# Patient Record
Sex: FEMALE | Race: BLACK OR AFRICAN AMERICAN | NOT HISPANIC OR LATINO | Employment: FULL TIME | URBAN - METROPOLITAN AREA
[De-identification: names, ages, dates, MRNs, and addresses within clinical notes are randomized per-mention and may not be internally consistent; named-entity substitution may affect disease eponyms.]

---

## 2010-12-28 LAB
CHOLEST SERPL-MCNC: 202 MG/DL
HDLC SERPL-MCNC: 62 MG/DL
LDLC SERPL CALC-MCNC: NORMAL MG/DL
NONHDLC SERPL-MCNC: NORMAL MG/DL
TRIGL SERPL-MCNC: 132 MG/DL
TSH SERPL-ACNC: 2.52

## 2012-05-31 LAB
CHOLEST SERPL-MCNC: 162 MG/DL
HDLC SERPL-MCNC: 48 MG/DL
LDLC SERPL CALC-MCNC: 91 MG/DL
NONHDLC SERPL-MCNC: NORMAL MG/DL
TRIGL SERPL-MCNC: 113 MG/DL
TSH SERPL-ACNC: 2.02 UIU/ML

## 2015-11-25 LAB
HBA1C MFR BLD: 10.1 % (ref 0–5.7)
PAP-ABSTRACT: NORMAL
TSH SERPL-ACNC: 1.75 UIU/ML

## 2016-12-15 LAB
CHOLEST SERPL-MCNC: 185 MG/DL
CHOLEST SERPL-MCNC: 185 MG/DL (ref 100–199)
HBA1C MFR BLD: 6.5 % (ref 0–5.7)
HBA1C MFR BLD: 6.5 % (ref 4.8–5.6)
HDLC SERPL-MCNC: 47 MG/DL
HDLC SERPL-MCNC: 47 MG/DL
LDLC SERPL CALC-MCNC: 107 MG/DL
LDLC SERPL CALC-MCNC: 107 MG/DL (ref 0–99)
NONHDLC SERPL-MCNC: NORMAL MG/DL
TRIGL SERPL-MCNC: 155 MG/DL
TRIGL SERPL-MCNC: 155 MG/DL (ref 0–149)
TSH SERPL-ACNC: 1.5 UIU/ML
TSH SERPL-ACNC: 1.5 UIU/ML (ref 0.45–4.5)

## 2017-01-04 ENCOUNTER — OFFICE VISIT (OUTPATIENT)
Dept: FAMILY MEDICINE | Facility: CLINIC | Age: 52
End: 2017-01-04

## 2017-01-04 VITALS
WEIGHT: 221 LBS | OXYGEN SATURATION: 98 % | HEART RATE: 84 BPM | SYSTOLIC BLOOD PRESSURE: 102 MMHG | HEIGHT: 64 IN | DIASTOLIC BLOOD PRESSURE: 68 MMHG | BODY MASS INDEX: 37.73 KG/M2

## 2017-01-04 DIAGNOSIS — D64.9 ANEMIA, UNSPECIFIED TYPE: ICD-10-CM

## 2017-01-04 DIAGNOSIS — R73.02 IGT (IMPAIRED GLUCOSE TOLERANCE): Primary | ICD-10-CM

## 2017-01-04 DIAGNOSIS — I10 BENIGN ESSENTIAL HYPERTENSION: ICD-10-CM

## 2017-01-04 PROCEDURE — 99214 OFFICE O/P EST MOD 30 MIN: CPT | Performed by: FAMILY MEDICINE

## 2017-01-04 NOTE — PROGRESS NOTES
"Here to review labs done at her GYN.   Lipids and TSH were normal; A1c 6.5% and HGB 10.3.   She has had a history of mild anemia, for which she takes iron. She is still menstruating, but periods are not heavy. She states she has history of fibroids.   She has gained weight over the past year, her job is mostly desk work. She feels she does some stress eating. Maternal uncle with diabetes.  + GERD, no dark stools.  Patient Active Problem List   Diagnosis     Benign essential hypertension     Current Outpatient Prescriptions   Medication     omeprazole (PRILOSEC) 20 MG CR capsule     lisinopril-hydrochlorothiazide (PRINZIDE,ZESTORETIC) 10-12.5 MG per tablet     No current facility-administered medications for this visit.     OBJECTIVE: /68 mmHg  Pulse 84  Ht 1.619 m (5' 3.75\")  Wt 100.245 kg (221 lb)  BMI 38.24 kg/m2  SpO2 98%  LMP 12/20/2016 NAD intelligent good mood.   (R73.02) IGT (impaired glucose tolerance)  (primary encounter diagnosis)  Comment:   Plan: given a low refined carb diet, and advice to make small changes, but try to eliminate refined carbs, mac wheat flour and sugars. RTC 2 months for recheck. Metformin if no improvement.    (D64.9) Anemia, unspecified type  Comment: no other CBC parameters available, but likely microcytic  Plan: trial of iron with Vit C and RTC 2 months.    (I10) Benign essential hypertension  Comment: well controlled  Plan: no change      "

## 2017-01-04 NOTE — MR AVS SNAPSHOT
After Visit Summary   1/4/2017    Cheyenne Beckman    MRN: [id number]           Patient Information     Date Of Birth          1965        Visit Information        Provider Department      1/4/2017 4:30 PM Bk Brown MD [facility]        Today's Diagnoses     IGT (impaired glucose tolerance)    -  1     Anemia, unspecified type           Care Instructions    Eliminate almost all processed foods, including bakery products made from wheat, refined sugar, and high fructose corn syrup from the diet. No more than 4 oz of any fruit juice daily. Vegetable juices would be an good substitute. Limit white potatoes, corn, and rice to 1/2 cup a day total, if trying to lose weight. Eat all other fruits and vegetables wanted, and TRY to eat green vegetables (leafy greens, broccoli, celery, Brussell sprouts, etc.) every day. For grains, oatmeal, brown or wild rice are fine. Aged cheeses and yogurts are fine, but limit milk to a glass a day. Eat good fats ( olive oil, grassfed butter- find at [employer], co-ops, etc-expensive),and avoid all deep fried foods.  Eat fish, poultry, limited beef and pork, eggs(try to get eggs with high omega 3),  Small amounts of nuts and peanuts. Modest amounts of dark chocolate are healthy. Honey in moderation seems to be a healthy sweetener.    For weight loss, limit your sitting by walking, house and yard work, or more formal exercise; and keep track of your calories. Weight yourself everyday, and keep a record! Try to reduce your food portions. Goal is to lose 1-2 pounds a month(not week!).    Ferrous sulfate 325 mg twice daily, taken with Vit C 500 mg twice daily. Recheck hemoglobin in 2 months.          Follow-ups after your visit        Who to contact     If you have questions or need follow up information about today's clinic visit or your schedule please contact [facility] directly at [phone].  Normal or non-critical lab and imaging  "results will be communicated to you by MyChart, letter or phone within 4 business days after the clinic has received the results. If you do not hear from us within 7 days, please contact the clinic through Dealisedt or phone. If you have a critical or abnormal lab result, we will notify you by phone as soon as possible.  Submit refill requests through Arkimedia or call your pharmacy and they will forward the refill request to us. Please allow 3 business days for your refill to be completed.          Additional Information About Your Visit        [employer] Information     Arkimedia lets you send messages to your doctor, view your test results, renew your prescriptions, schedule appointments and more. To sign up, go to [email or web address] . Click on \"Log in\" on the left side of the screen, which will take you to the Welcome page. Then click on \"Sign up Now\" on the right side of the page.     You will be asked to enter the access code listed below, as well as some personal information. Please follow the directions to create your username and password.     Your access code is: [email or web address]  Expires: 2017  5:09 PM     Your access code will  in 90 days. If you need help or a new code, please call your [place] clinic or [phone].        Care EveryWhere ID     This is your Care EveryWhere ID. This could be used by other organizations to access your [place] medical records  [id number]        Your Vitals Were     Pulse Height BMI (Body Mass Index) Pulse Oximetry Last Period       84 1.619 m (5' 3.75\") 38.24 kg/m2 98% 2016        Blood Pressure from Last 3 Encounters:   17 102/68   16 160/85   16 138/88    Weight from Last 3 Encounters:   17 100.245 kg (221 lb)   16 90.719 kg (200 lb)   16 99.338 kg (219 lb)              Today, you had the following     No orders found for display       Primary Care Provider Office Phone # Fax #    Bk Brown [phone] " [phone]       [facility] [address]  [facility] [address]        Thank you!     Thank you for choosing [facility]  for your care. Our goal is always to provide you with excellent care. Hearing back from our patients is one way we can continue to improve our services. Please take a few minutes to complete the written survey that you may receive in the mail after your visit with us. Thank you!             Your Updated Medication List - Protect others around you: Learn how to safely use, store and throw away your medicines at www.disposemymeds.org.          This list is accurate as of: 1/4/17  5:09 PM.  Always use your most recent med list.                   Brand Name Dispense Instructions for use    lisinopril-hydrochlorothiazide 10-12.5 MG per tablet    PRINZIDE/ZESTORETIC    90 tablet    Take 1 tablet by mouth daily       omeprazole 20 MG CR capsule    priLOSEC     TAKE 1 CAPSULE ONCE A DAY BY MOUTH FOR 30 DAY(S)

## 2017-01-04 NOTE — PATIENT INSTRUCTIONS
Eliminate almost all processed foods, including bakery products made from wheat, refined sugar, and high fructose corn syrup from the diet. No more than 4 oz of any fruit juice daily. Vegetable juices would be an good substitute. Limit white potatoes, corn, and rice to 1/2 cup a day total, if trying to lose weight. Eat all other fruits and vegetables wanted, and TRY to eat green vegetables (leafy greens, broccoli, celery, Brussell sprouts, etc.) every day. For grains, oatmeal, brown or wild rice are fine. Aged cheeses and yogurts are fine, but limit milk to a glass a day. Eat good fats ( olive oil, grassfed butter- find at [employer], co-ops, etc-expensive),and avoid all deep fried foods.  Eat fish, poultry, limited beef and pork, eggs(try to get eggs with high omega 3),  Small amounts of nuts and peanuts. Modest amounts of dark chocolate are healthy. Honey in moderation seems to be a healthy sweetener.    For weight loss, limit your sitting by walking, house and yard work, or more formal exercise; and keep track of your calories. Weight yourself everyday, and keep a record! Try to reduce your food portions. Goal is to lose 1-2 pounds a month(not week!).    Ferrous sulfate 325 mg twice daily, taken with Vit C 500 mg twice daily. Recheck hemoglobin in 2 months.

## 2017-02-17 ENCOUNTER — HOSPITAL ENCOUNTER (OUTPATIENT)
Dept: MAMMOGRAPHY | Facility: CLINIC | Age: 52
Discharge: HOME | End: 2017-02-17
Attending: OBSTETRICS & GYNECOLOGY | Admitting: OBSTETRICS & GYNECOLOGY
Payer: COMMERCIAL

## 2017-02-17 DIAGNOSIS — Z12.31 VISIT FOR SCREENING MAMMOGRAM: ICD-10-CM

## 2017-02-17 PROCEDURE — G0202 SCR MAMMO BI INCL CAD: HCPCS

## 2017-02-27 ENCOUNTER — TRANSFERRED RECORDS (OUTPATIENT)
Dept: FAMILY MEDICINE | Facility: CLINIC | Age: 52
End: 2017-02-27

## 2017-03-24 ENCOUNTER — TELEPHONE (OUTPATIENT)
Dept: FAMILY MEDICINE | Facility: CLINIC | Age: 52
End: 2017-03-24

## 2017-03-24 NOTE — LETTER
[facility] [address] [place] [address]  Phone: [phone]          3/24/2017      Cheyenne Beckman      TO WHOM IT MAY CONCERN:    Cheyenne [place]ton called our clinic 3/22/17 and spoke with nurse regarding flu-like symptoms including fever, cough and body aches. Due to illness, she was unable to work 3/22-3/24.           Bk Brown MD

## 2017-08-25 DIAGNOSIS — Z76.0 ENCOUNTER FOR MEDICATION REFILL: ICD-10-CM

## 2017-08-25 RX ORDER — LISINOPRIL/HYDROCHLOROTHIAZIDE 10-12.5 MG
1 TABLET ORAL DAILY
Qty: 30 TABLET | Refills: 0 | Status: SHIPPED | OUTPATIENT
Start: 2017-08-25 | End: 2017-09-22

## 2017-09-01 ENCOUNTER — OFFICE VISIT (OUTPATIENT)
Dept: FAMILY MEDICINE | Facility: CLINIC | Age: 52
End: 2017-09-01

## 2017-09-01 VITALS
TEMPERATURE: 98.3 F | WEIGHT: 217.4 LBS | OXYGEN SATURATION: 97 % | HEIGHT: 64 IN | SYSTOLIC BLOOD PRESSURE: 112 MMHG | BODY MASS INDEX: 37.11 KG/M2 | DIASTOLIC BLOOD PRESSURE: 76 MMHG | HEART RATE: 76 BPM | RESPIRATION RATE: 20 BRPM

## 2017-09-01 DIAGNOSIS — R73.02 IGT (IMPAIRED GLUCOSE TOLERANCE): ICD-10-CM

## 2017-09-01 DIAGNOSIS — I10 BENIGN ESSENTIAL HYPERTENSION: Primary | ICD-10-CM

## 2017-09-01 DIAGNOSIS — D50.0 IRON DEFICIENCY ANEMIA DUE TO CHRONIC BLOOD LOSS: ICD-10-CM

## 2017-09-01 LAB
% GRANULOCYTES: 53.7 % (ref 42.2–75.2)
HCT VFR BLD AUTO: 35.5 % (ref 35–46)
HEMOGLOBIN: 11.4 G/DL (ref 11.8–15.5)
LYMPHOCYTES NFR BLD AUTO: 38.3 % (ref 20.5–51.1)
MCH RBC QN AUTO: 27.4 PG (ref 27–31)
MCHC RBC AUTO-ENTMCNC: 32 G/DL (ref 33–37)
MCV RBC AUTO: 85.4 FL (ref 80–100)
MONOCYTES NFR BLD AUTO: 8 % (ref 1.7–9.3)
PLATELET # BLD AUTO: 236 K/UL (ref 140–450)
RBC # BLD AUTO: 4.16 X10/CMM (ref 3.7–5.2)
WBC # BLD AUTO: 5 X10/CMM (ref 3.8–11)

## 2017-09-01 PROCEDURE — 36415 COLL VENOUS BLD VENIPUNCTURE: CPT | Performed by: FAMILY MEDICINE

## 2017-09-01 PROCEDURE — 83540 ASSAY OF IRON: CPT | Mod: 90 | Performed by: FAMILY MEDICINE

## 2017-09-01 PROCEDURE — 85025 COMPLETE CBC W/AUTO DIFF WBC: CPT | Performed by: FAMILY MEDICINE

## 2017-09-01 PROCEDURE — 83036 HEMOGLOBIN GLYCOSYLATED A1C: CPT | Mod: 90 | Performed by: FAMILY MEDICINE

## 2017-09-01 PROCEDURE — 83550 IRON BINDING TEST: CPT | Mod: 90 | Performed by: FAMILY MEDICINE

## 2017-09-01 PROCEDURE — 80048 BASIC METABOLIC PNL TOTAL CA: CPT | Mod: 90 | Performed by: FAMILY MEDICINE

## 2017-09-01 PROCEDURE — 99214 OFFICE O/P EST MOD 30 MIN: CPT | Performed by: FAMILY MEDICINE

## 2017-09-01 RX ORDER — FERROUS SULFATE 325(65) MG
325 TABLET ORAL
COMMUNITY
End: 2022-09-19

## 2017-09-01 NOTE — MR AVS SNAPSHOT
"              After Visit Summary   2017    Cheyenne Beckman    MRN: [id number]           Patient Information     Date Of Birth          1965        Visit Information        Provider Department      2017 8:15 AM Bk Brown MD [facility]        Today's Diagnoses     Benign essential hypertension    -  1    Iron deficiency anemia due to chronic blood loss        IGT (impaired glucose tolerance)           Follow-ups after your visit        Who to contact     If you have questions or need follow up information about today's clinic visit or your schedule please contact [facility] directly at [phone].  Normal or non-critical lab and imaging results will be communicated to you by Callisionhart, letter or phone within 4 business days after the clinic has received the results. If you do not hear from us within 7 days, please contact the clinic through Callisionhart or phone. If you have a critical or abnormal lab result, we will notify you by phone as soon as possible.  Submit refill requests through TVTY or call your pharmacy and they will forward the refill request to us. Please allow 3 business days for your refill to be completed.          Additional Information About Your Visit        MyChart Information     TVTY lets you send messages to your doctor, view your test results, renew your prescriptions, schedule appointments and more. To sign up, go to [email or web address] . Click on \"Log in\" on the left side of the screen, which will take you to the Welcome page. Then click on \"Sign up Now\" on the right side of the page.     You will be asked to enter the access code listed below, as well as some personal information. Please follow the directions to create your username and password.     Your access code is: [email or web address]  Expires: 2017  3:09 PM     Your access code will  in 90 days. If you need help or a new code, please call your [place] clinic or " "[phone].        Care EveryWhere ID     This is your Care EveryWhere ID. This could be used by other organizations to access your [place] medical records  [id number]        Your Vitals Were     Pulse Temperature Respirations Height Last Period Pulse Oximetry    76 98.3  F (36.8  C) (Oral) 20 1.613 m (5' 3.5\") 08/23/2017 (Exact Date) 97%    BMI (Body Mass Index)                   37.91 kg/m2            Blood Pressure from Last 3 Encounters:   09/01/17 112/76   01/04/17 102/68   04/22/16 160/85    Weight from Last 3 Encounters:   09/01/17 98.6 kg (217 lb 6.4 oz)   01/04/17 100.2 kg (221 lb)   04/22/16 90.7 kg (200 lb)              We Performed the Following     Basic Metabolic Panel (8) ([facility])     CBC with Diff/Plt ([facility])     Hemoglobin A1C ([facility])        Primary Care Provider Office Phone # Fax #    Bk Brown [phone] [address] [facility] [address]        Equal Access to Services     [facility] AH: Hadii aad ku hadasho Soomaali, waaxda luqadaha, qaybta kaalmada adeegyada, yuridia daugherty hayhiram rapp . So [facility] [phone].    ATENCIÓN: Si habla español, tiene a woodruff disposición servicios gratuitos de asistencia lingüística. Llame al [phone].    We comply with applicable federal civil rights laws and [place] laws. We do not discriminate on the basis of race, color, national origin, age, disability sex, sexual orientation or gender identity.            Thank you!     Thank you for choosing [facility]  for your care. Our goal is always to provide you with excellent care. Hearing back from our patients is one way we can continue to improve our services. Please take a few minutes to complete the written survey that you may receive in the mail after your visit with us. Thank you!             Your Updated Medication List - Protect others around you: Learn how to safely use, store and throw away your medicines at www.disposemymeds.org.       "    This list is accurate as of: 9/1/17  3:09 PM.  Always use your most recent med list.                   Brand Name Dispense Instructions for use Diagnosis    ferrous sulfate 325 (65 FE) MG tablet    IRON     Take 325 mg by mouth daily (with breakfast) Recommend take with vitamin c        lisinopril-hydrochlorothiazide 10-12.5 MG per tablet    PRINZIDE/ZESTORETIC    30 tablet    Take 1 tablet by mouth daily    Encounter for medication refill       omeprazole 20 MG CR capsule    priLOSEC     as needed

## 2017-09-01 NOTE — PROGRESS NOTES
"Recheck elevated A1c, she has lost about 4# form this spring, when her A1c was 6.5%.   Anemia noted at GYN's. History of heavy periods.   Also c/o intermittent epigastric pain, without change in stools, but occasionally nausea.. She has been on omeprazole off and on, and it helps. She had been taking iron supplements, and also PPI, but stopped these as she was not seeing much change. Colonoscopy normal this year.  OBJECTIVE: /76  Pulse 76  Temp 98.3  F (36.8  C) (Oral)  Resp 20  Ht 1.613 m (5' 3.5\")  Wt 98.6 kg (217 lb 6.4 oz)  LMP 08/23/2017 (Exact Date)  SpO2 97%  BMI 37.91 kg/m2 NAD RRR. Lungs are clear. Abdomen has slight epigastric and LLQ tenderness. Obese.  Lab Results   Component Value Date    WBC 5.0 09/01/2017     Lab Results   Component Value Date    RBC 4.16 09/01/2017     Lab Results   Component Value Date    HGB 11.4 09/01/2017     Lab Results   Component Value Date    HCT 35.5 09/01/2017     No components found for: MCT  Lab Results   Component Value Date    MCV 85.4 09/01/2017     Lab Results   Component Value Date    MCH 27.4 09/01/2017     Lab Results   Component Value Date    MCHC 32.0 09/01/2017     No results found for: RDW  Lab Results   Component Value Date     09/01/2017       (I10) Benign essential hypertension  (primary encounter diagnosis)  Comment:   Plan: Basic Metabolic Panel (8) ([facility])            (D50.0) Iron deficiency anemia due to chronic blood loss  Comment: GI or GYN  Plan: CBC with Diff/Plt ([facility])        Add FE, TIBC  IBC. prob needs EGD    (R73.02) IGT (impaired glucose tolerance)  Comment:   Plan: Hemoglobin A1C ([facility])                "

## 2017-09-02 LAB
BUN SERPL-MCNC: 11 MG/DL (ref 6–24)
BUN/CREATININE RATIO: 13 (ref 9–23)
CALCIUM SERPL-MCNC: 9.1 MG/DL (ref 8.7–10.2)
CHLORIDE SERPLBLD-SCNC: 101 MMOL/L (ref 96–106)
CREAT SERPL-MCNC: 0.88 MG/DL (ref 0.57–1)
EGFR IF AFRICN AM: 89 ML/MIN/1.73
EGFR IF NONAFRICN AM: 77 ML/MIN/1.73
GLUCOSE SERPL-MCNC: 112 MG/DL (ref 65–99)
HBA1C MFR BLD: 6.4 % (ref 4.8–5.6)
POTASSIUM SERPL-SCNC: 4.3 MMOL/L (ref 3.5–5.2)
SODIUM SERPL-SCNC: 140 MMOL/L (ref 134–144)
TOTAL CO2: 25 MMOL/L (ref 18–28)

## 2017-09-07 LAB
IRON BINDING CAP: 323 UG/DL (ref 250–450)
IRON SATURATION: 18 % (ref 15–55)
IRON: 58 UG/DL (ref 27–159)
UIBC: 265 UG/DL (ref 131–425)

## 2017-09-08 ENCOUNTER — TRANSFERRED RECORDS (OUTPATIENT)
Dept: FAMILY MEDICINE | Facility: CLINIC | Age: 52
End: 2017-09-08

## 2017-09-22 DIAGNOSIS — Z76.0 ENCOUNTER FOR MEDICATION REFILL: ICD-10-CM

## 2017-09-22 RX ORDER — LISINOPRIL/HYDROCHLOROTHIAZIDE 10-12.5 MG
1 TABLET ORAL DAILY
Qty: 90 TABLET | Refills: 3 | Status: SHIPPED | OUTPATIENT
Start: 2017-09-22 | End: 2018-09-11

## 2017-09-22 NOTE — TELEPHONE ENCOUNTER
BP Medication refill      BP Readings from Last 3 Encounters:   09/01/17 112/76   01/04/17 102/68   04/22/16 160/85         BMP within 6 months? yes  Last Basic Metabolic Panel:  Lab Results   Component Value Date     09/01/2017      Lab Results   Component Value Date    POTASSIUM 4.3 09/01/2017     Lab Results   Component Value Date    CHLORIDE 101 09/01/2017     Lab Results   Component Value Date    SUZANNE 9.1 09/01/2017     No results found for: CO2  Lab Results   Component Value Date    BUN 11 09/01/2017    BUN 13 09/01/2017     Lab Results   Component Value Date    CR 0.88 09/01/2017     Lab Results   Component Value Date     09/01/2017

## 2017-10-18 ENCOUNTER — TELEPHONE (OUTPATIENT)
Dept: FAMILY MEDICINE | Facility: CLINIC | Age: 52
End: 2017-10-18

## 2017-10-19 NOTE — TELEPHONE ENCOUNTER
10/18/17 patient calls with questions regarding September lab results.   1. Anemia/low iron- patient is not taking iron supplements - hadn't received result note to start these. Discussed Dr. Brown recommends repeat CBC to see where hgb is now since 6 weeks have passed since last check. Patient agrees and relates still having intermittent epigastric discomfort, thinks possibly worse with dairy products. Saw ENT and states he thought possibly GERD. Patient would like to see Dr. Brown again to check CBC and discuss and get plan. Appt scheduled for 10/27/17. She will keep a log regarding food and epigastric discomfort. Ok to try antacids or PPI if not on.    2. Hgba1c of 6.4% 9/2017 and 6.5% 12/2016 at gyne. Plan: per Dr. Brown - recommend diet/exercise/weight loss and repeat in 3/2018. Discussed and patient agrees. Mailed low carb diet info.   Frances Hopkins RN

## 2017-11-17 ENCOUNTER — OFFICE VISIT (OUTPATIENT)
Dept: FAMILY MEDICINE | Facility: CLINIC | Age: 52
End: 2017-11-17

## 2017-11-17 DIAGNOSIS — Z53.9 NO SHOW: Primary | ICD-10-CM

## 2017-11-17 PROCEDURE — 99207 ZZC NO SHOW FOR SCHEDULED APPT: CPT | Performed by: FAMILY MEDICINE

## 2017-11-17 NOTE — MR AVS SNAPSHOT
"              After Visit Summary   2017    Cheyenne Beckman    MRN: [id number]           Patient Information     Date Of Birth          1965        Visit Information        Provider Department      2017 12:45 PM Bk Brown MD [facility]        Today's Diagnoses     NO SHOW    -  1       Follow-ups after your visit        Who to contact     If you have questions or need follow up information about today's clinic visit or your schedule please contact [facility] directly at [phone].  Normal or non-critical lab and imaging results will be communicated to you by MyChart, letter or phone within 4 business days after the clinic has received the results. If you do not hear from us within 7 days, please contact the clinic through CallGraderhart or phone. If you have a critical or abnormal lab result, we will notify you by phone as soon as possible.  Submit refill requests through TableNOW or call your pharmacy and they will forward the refill request to us. Please allow 3 business days for your refill to be completed.          Additional Information About Your Visit        MyChart Information     TableNOW lets you send messages to your doctor, view your test results, renew your prescriptions, schedule appointments and more. To sign up, go to [email or web address] . Click on \"Log in\" on the left side of the screen, which will take you to the Welcome page. Then click on \"Sign up Now\" on the right side of the page.     You will be asked to enter the access code listed below, as well as some personal information. Please follow the directions to create your username and password.     Your access code is: [email or web address]  Expires: 2018  9:56 AM     Your access code will  in 90 days. If you need help or a new code, please call your [place] clinic or [phone].        Care EveryWhere ID     This is your Care EveryWhere ID. This could be used by other organizations to " access your [place] medical records  [id number]        Your Vitals Were     Last Period                   11/06/2017 (Approximate)            Blood Pressure from Last 3 Encounters:   11/29/17 102/66   09/01/17 112/76   01/04/17 102/68    Weight from Last 3 Encounters:   11/29/17 101.6 kg (224 lb)   09/01/17 98.6 kg (217 lb 6.4 oz)   01/04/17 100.2 kg (221 lb)              Today, you had the following     No orders found for display       Primary Care Provider Office Phone # Fax #    Bk Brown [phone] [address] [facility] [address]        Equal Access to Services     [employer] : Hadii courtney hough hadasho Soabdelrahmanali, waaxda luqadaha, qaybta kaalmada adeegyada, yuridia rapp . So [facility] [phone].    ATENCIÓN: Si habla español, tiene a woodruff disposición servicios gratuitos de asistencia lingüística. Llame al [phone].    We comply with applicable federal civil rights laws and [place] laws. We do not discriminate on the basis of race, color, national origin, age, disability, sex, sexual orientation, or gender identity.            Thank you!     Thank you for choosing [facility]  for your care. Our goal is always to provide you with excellent care. Hearing back from our patients is one way we can continue to improve our services. Please take a few minutes to complete the written survey that you may receive in the mail after your visit with us. Thank you!             Your Updated Medication List - Protect others around you: Learn how to safely use, store and throw away your medicines at www.disposemymeds.org.          This list is accurate as of 11/17/17 11:59 PM.  Always use your most recent med list.                   Brand Name Dispense Instructions for use Diagnosis    ferrous sulfate 325 (65 Fe) MG tablet    IRON     Take 325 mg by mouth daily (with breakfast) Recommend take with vitamin c        lisinopril-hydrochlorothiazide  10-12.5 MG per tablet    PRINZIDE/ZESTORETIC    90 tablet    Take 1 tablet by mouth daily    Encounter for medication refill

## 2017-11-29 ENCOUNTER — OFFICE VISIT (OUTPATIENT)
Dept: FAMILY MEDICINE | Facility: CLINIC | Age: 52
End: 2017-11-29

## 2017-11-29 VITALS
SYSTOLIC BLOOD PRESSURE: 102 MMHG | DIASTOLIC BLOOD PRESSURE: 66 MMHG | BODY MASS INDEX: 39.06 KG/M2 | HEART RATE: 64 BPM | RESPIRATION RATE: 20 BRPM | WEIGHT: 224 LBS

## 2017-11-29 DIAGNOSIS — R10.13 ABDOMINAL PAIN, EPIGASTRIC: ICD-10-CM

## 2017-11-29 DIAGNOSIS — D50.0 IRON DEFICIENCY ANEMIA DUE TO CHRONIC BLOOD LOSS: Primary | ICD-10-CM

## 2017-11-29 LAB
% GRANULOCYTES: 50.2 % (ref 42.2–75.2)
HCT VFR BLD AUTO: 33.9 % (ref 35–46)
HEMOGLOBIN: 10.9 G/DL (ref 11.8–15.5)
LYMPHOCYTES NFR BLD AUTO: 43.3 % (ref 20.5–51.1)
MCH RBC QN AUTO: 27.8 PG (ref 27–31)
MCHC RBC AUTO-ENTMCNC: 32.2 G/DL (ref 33–37)
MCV RBC AUTO: 86.3 FL (ref 80–100)
MONOCYTES NFR BLD AUTO: 6.5 % (ref 1.7–9.3)
PLATELET # BLD AUTO: 232 K/UL (ref 140–450)
RBC # BLD AUTO: 3.93 X10/CMM (ref 3.7–5.2)
WBC # BLD AUTO: 6.3 X10/CMM (ref 3.8–11)

## 2017-11-29 PROCEDURE — 99214 OFFICE O/P EST MOD 30 MIN: CPT | Performed by: FAMILY MEDICINE

## 2017-11-29 PROCEDURE — 36415 COLL VENOUS BLD VENIPUNCTURE: CPT | Performed by: FAMILY MEDICINE

## 2017-11-29 PROCEDURE — 85025 COMPLETE CBC W/AUTO DIFF WBC: CPT | Performed by: FAMILY MEDICINE

## 2017-11-29 RX ORDER — OMEPRAZOLE 40 MG/1
40 CAPSULE, DELAYED RELEASE ORAL DAILY
Qty: 30 CAPSULE | Refills: 1 | Status: SHIPPED | OUTPATIENT
Start: 2017-11-29 | End: 2018-02-02

## 2017-11-29 NOTE — PROGRESS NOTES
Abdominal pain for several years. Epigastric, feels tight. Usually after she lies down. It may wake her. It lasts for about 30 minutes, and can cause nausea. She has vomited once. BM's are OK. She had been on PPI, but stopped, as it did not seem to be helpful.   PMH: HTN  ROS: periods heavy first 2 days. Stools normal color.  Lab Results   Component Value Date    WBC 6.3 11/29/2017     Lab Results   Component Value Date    RBC 3.93 11/29/2017     Lab Results   Component Value Date    HGB 10.9 11/29/2017     Lab Results   Component Value Date    HCT 33.9 11/29/2017     No components found for: MCT  Lab Results   Component Value Date    MCV 86.3 11/29/2017     Lab Results   Component Value Date    MCH 27.8 11/29/2017     Lab Results   Component Value Date    MCHC 32.2 11/29/2017     No results found for: RDW  Lab Results   Component Value Date     11/29/2017     /66  Pulse 64  Resp 20  Wt 101.6 kg (224 lb)  LMP 11/06/2017 (Approximate)  BMI 39.06 kg/m2   NAD alert and oriented. Abdomen is not tender    (D50.0) Iron deficiency anemia due to chronic blood loss  (primary encounter diagnosis)    (R10.13) Abdominal pain, epigastric  Comment:   Plan: EGD,         omeprazole (PRILOSEC) 40 MG capsule daily

## 2017-11-29 NOTE — MR AVS SNAPSHOT
After Visit Summary   11/29/2017    Cheyenne Beckman    MRN: [id number]           Patient Information     Date Of Birth          1965        Visit Information        Provider Department      11/29/2017 4:30 PM Bk Brown MD [facility]        Today's Diagnoses     Iron deficiency anemia due to chronic blood loss    -  1    Abdominal pain, epigastric           Follow-ups after your visit        Additional Services     GASTROENTEROLOGY ADULT REF PROCEDURE ONLY       Last Lab Result: Creatinine (mg/dL)       Date                     Value                 09/01/2017               0.88             ----------  Body mass index is 39.06 kg/(m^2).     Needed:  No  Language:  English    Patient will be contacted to schedule procedure.     Please be aware that coverage of these services is subject to the terms and limitations of your health insurance plan.  Call member services at your health plan with any benefit or coverage questions.  Any procedures must be performed at a [place] facility OR coordinated by your clinic's referral office.    Please bring the following with you to your appointment:    (1) Any X-Rays, CTs or MRIs which have been performed.  Contact the facility where they were done to arrange for  prior to your scheduled appointment.    (2) List of current medications   (3) This referral request   (4) Any documents/labs given to you for this referral                  Who to contact     If you have questions or need follow up information about today's clinic visit or your schedule please contact [facility] directly at [phone].  Normal or non-critical lab and imaging results will be communicated to you by MyChart, letter or phone within 4 business days after the clinic has received the results. If you do not hear from us within 7 days, please contact the clinic through MyChart or phone. If you have a critical or abnormal lab result, we  "will notify you by phone as soon as possible.  Submit refill requests through [employer] or call your pharmacy and they will forward the refill request to us. Please allow 3 business days for your refill to be completed.          Additional Information About Your Visit        WinningAdvantagehart Information     [employer] lets you send messages to your doctor, view your test results, renew your prescriptions, schedule appointments and more. To sign up, go to [email or web address] . Click on \"Log in\" on the left side of the screen, which will take you to the Welcome page. Then click on \"Sign up Now\" on the right side of the page.     You will be asked to enter the access code listed below, as well as some personal information. Please follow the directions to create your username and password.     Your access code is: [email or web address]  Expires: 2017  2:09 PM     Your access code will  in 90 days. If you need help or a new code, please call your [place] clinic or [phone].        Care EveryWhere ID     This is your Care EveryWhere ID. This could be used by other organizations to access your [place] medical records  [id number]        Your Vitals Were     Pulse Respirations Last Period BMI (Body Mass Index)          64 20 2017 (Approximate) 39.06 kg/m2         Blood Pressure from Last 3 Encounters:   17 102/66   17 112/76   17 102/68    Weight from Last 3 Encounters:   17 101.6 kg (224 lb)   17 98.6 kg (217 lb 6.4 oz)   17 100.2 kg (221 lb)              We Performed the Following     CBC with Diff/Plt ([facility])     GASTROENTEROLOGY ADULT REF PROCEDURE ONLY        Primary Care Provider Office Phone # Fax #    Bk Brown [phone] [address]  [facility] [address]        Equal Access to Services     HELEN WEAVER : Hadii aad ku hadasho Soomaali, waaxda luqadaha, qaybta kaalmada adeegyanilay, yuridia rapp . So [facility] " [phone].    ATENCIÓN: Si rachel delarosa, tiene a woodruff disposición servicios gratuitos de asistencia lingüística. Dominic tanner [phone].    We comply with applicable federal civil rights laws and [place] laws. We do not discriminate on the basis of race, color, national origin, age, disability, sex, sexual orientation, or gender identity.            Thank you!     Thank you for choosing [facility]  for your care. Our goal is always to provide you with excellent care. Hearing back from our patients is one way we can continue to improve our services. Please take a few minutes to complete the written survey that you may receive in the mail after your visit with us. Thank you!             Your Updated Medication List - Protect others around you: Learn how to safely use, store and throw away your medicines at www.disposemymeds.org.          This list is accurate as of: 11/29/17  5:17 PM.  Always use your most recent med list.                   Brand Name Dispense Instructions for use Diagnosis    ferrous sulfate 325 (65 FE) MG tablet    IRON     Take 325 mg by mouth daily (with breakfast) Recommend take with vitamin c        lisinopril-hydrochlorothiazide 10-12.5 MG per tablet    PRINZIDE/ZESTORETIC    90 tablet    Take 1 tablet by mouth daily    Encounter for medication refill       omeprazole 20 MG CR capsule    priLOSEC     as needed

## 2017-12-27 ENCOUNTER — TRANSFERRED RECORDS (OUTPATIENT)
Dept: FAMILY MEDICINE | Facility: CLINIC | Age: 52
End: 2017-12-27

## 2018-01-05 ENCOUNTER — TRANSFERRED RECORDS (OUTPATIENT)
Dept: FAMILY MEDICINE | Facility: CLINIC | Age: 53
End: 2018-01-05

## 2018-02-02 DIAGNOSIS — D50.0 IRON DEFICIENCY ANEMIA DUE TO CHRONIC BLOOD LOSS: ICD-10-CM

## 2018-02-02 DIAGNOSIS — R10.13 ABDOMINAL PAIN, EPIGASTRIC: ICD-10-CM

## 2018-02-02 DIAGNOSIS — Z79.899 ENCOUNTER FOR LONG-TERM (CURRENT) USE OF MEDICATIONS: Primary | ICD-10-CM

## 2018-02-02 RX ORDER — OMEPRAZOLE 40 MG/1
CAPSULE, DELAYED RELEASE ORAL
Qty: 30 CAPSULE | Refills: 9 | Status: SHIPPED | OUTPATIENT
Start: 2018-02-02 | End: 2018-03-29

## 2018-02-02 NOTE — TELEPHONE ENCOUNTER
omeprazole (PRILOSEC) 40 MG capsule   LAST  Med check 11/29/17   last labs(for RX) 11/29/17  Next  appt scheduled =  none  Jessie Varner MA

## 2018-03-29 DIAGNOSIS — D50.0 IRON DEFICIENCY ANEMIA DUE TO CHRONIC BLOOD LOSS: ICD-10-CM

## 2018-03-29 DIAGNOSIS — Z79.899 ENCOUNTER FOR LONG-TERM (CURRENT) USE OF MEDICATIONS: ICD-10-CM

## 2018-03-29 DIAGNOSIS — R10.13 ABDOMINAL PAIN, EPIGASTRIC: ICD-10-CM

## 2018-03-30 DIAGNOSIS — R10.13 ABDOMINAL PAIN, EPIGASTRIC: ICD-10-CM

## 2018-03-30 DIAGNOSIS — D50.0 IRON DEFICIENCY ANEMIA DUE TO CHRONIC BLOOD LOSS: ICD-10-CM

## 2018-03-30 DIAGNOSIS — Z79.899 ENCOUNTER FOR LONG-TERM (CURRENT) USE OF MEDICATIONS: ICD-10-CM

## 2018-03-30 RX ORDER — OMEPRAZOLE 40 MG/1
CAPSULE, DELAYED RELEASE ORAL
Qty: 90 CAPSULE | Refills: 2 | Status: SHIPPED | OUTPATIENT
Start: 2018-03-30 | End: 2020-04-03

## 2018-03-30 RX ORDER — OMEPRAZOLE 40 MG/1
CAPSULE, DELAYED RELEASE ORAL
Qty: 90 CAPSULE | Refills: 2 | Status: SHIPPED | OUTPATIENT
Start: 2018-03-30 | End: 2018-03-30

## 2018-04-02 NOTE — TELEPHONE ENCOUNTER
Per note in EPIC, there was a transmission failure.  I called and left the information on the voice mail at the pharmacy.  After the call, I then realized this is a duplicate request and should not have been entered.  It was just approved on 3/30/19- duplicate request.      Joel Maynard,   [facility]  [phone]

## 2018-05-15 ENCOUNTER — TELEPHONE (OUTPATIENT)
Dept: FAMILY MEDICINE | Facility: CLINIC | Age: 53
End: 2018-05-15

## 2018-05-17 NOTE — TELEPHONE ENCOUNTER
Called patient with pharmacist's reply. Patient will monitor BP while taking the supplement and call clinic with any questions.  Frances Hopkins RN

## 2018-05-17 NOTE — TELEPHONE ENCOUNTER
----- Message from Amber Williamson RPH sent at 5/17/2018  3:12 PM CDT -----  Regarding: RE: BIOX4  Nucific   Yes, sorry for the delay. I think it should be okay to try, but to monitor for any increasing blood pressure as it does contain green tea, which has caffeine as a stimulant. It didn't appear to have issues with the other ingredients from what little data we do have.    Amber Williamson, Pharm.D, [facility]  Medication Therapy Management Pharmacist  [phone]    ----- Message -----     From: Frances Hopkins RN     Sent: 5/16/2018  12:03 PM       To: Amber Williamson RPH  Subject: BIOX4  Nucific                                   BIOX4 Nucific -- patient calls asking if ok to take this with her lisinopril/hctz?  What do you think? Ok to try?  Frances

## 2018-09-28 ENCOUNTER — OFFICE VISIT (OUTPATIENT)
Dept: FAMILY MEDICINE | Facility: CLINIC | Age: 53
End: 2018-09-28

## 2018-09-28 VITALS
RESPIRATION RATE: 12 BRPM | WEIGHT: 227.2 LBS | BODY MASS INDEX: 38.79 KG/M2 | HEIGHT: 64 IN | HEART RATE: 76 BPM | SYSTOLIC BLOOD PRESSURE: 118 MMHG | DIASTOLIC BLOOD PRESSURE: 64 MMHG

## 2018-09-28 DIAGNOSIS — R73.02 IGT (IMPAIRED GLUCOSE TOLERANCE): ICD-10-CM

## 2018-09-28 DIAGNOSIS — Z13.6 ENCOUNTER FOR SCREENING FOR CARDIOVASCULAR DISORDERS: ICD-10-CM

## 2018-09-28 DIAGNOSIS — D64.9 ANEMIA, UNSPECIFIED TYPE: ICD-10-CM

## 2018-09-28 DIAGNOSIS — Z76.0 ENCOUNTER FOR MEDICATION REFILL: ICD-10-CM

## 2018-09-28 DIAGNOSIS — I10 BENIGN ESSENTIAL HYPERTENSION: Primary | ICD-10-CM

## 2018-09-28 LAB
% GRANULOCYTES: 52.7 % (ref 42.2–75.2)
HCT VFR BLD AUTO: 34.5 % (ref 35–46)
HEMOGLOBIN: 11.3 G/DL (ref 11.8–15.5)
LYMPHOCYTES NFR BLD AUTO: 39.8 % (ref 20.5–51.1)
MCH RBC QN AUTO: 27.5 PG (ref 27–31)
MCHC RBC AUTO-ENTMCNC: 32.7 G/DL (ref 33–37)
MCV RBC AUTO: 84.1 FL (ref 80–100)
MONOCYTES NFR BLD AUTO: 7.5 % (ref 1.7–9.3)
PLATELET # BLD AUTO: 263 K/UL (ref 140–450)
RBC # BLD AUTO: 4.1 X10/CMM (ref 3.7–5.2)
WBC # BLD AUTO: 6.4 X10/CMM (ref 3.8–11)

## 2018-09-28 PROCEDURE — 80053 COMPREHEN METABOLIC PANEL: CPT | Mod: 90 | Performed by: NURSE PRACTITIONER

## 2018-09-28 PROCEDURE — 80061 LIPID PANEL: CPT | Mod: 90 | Performed by: NURSE PRACTITIONER

## 2018-09-28 PROCEDURE — 36415 COLL VENOUS BLD VENIPUNCTURE: CPT | Performed by: NURSE PRACTITIONER

## 2018-09-28 PROCEDURE — 99213 OFFICE O/P EST LOW 20 MIN: CPT | Performed by: NURSE PRACTITIONER

## 2018-09-28 PROCEDURE — 83036 HEMOGLOBIN GLYCOSYLATED A1C: CPT | Mod: 90 | Performed by: NURSE PRACTITIONER

## 2018-09-28 PROCEDURE — 85025 COMPLETE CBC W/AUTO DIFF WBC: CPT | Performed by: NURSE PRACTITIONER

## 2018-09-28 RX ORDER — LISINOPRIL/HYDROCHLOROTHIAZIDE 10-12.5 MG
1 TABLET ORAL DAILY
Qty: 90 TABLET | Refills: 3 | Status: SHIPPED | OUTPATIENT
Start: 2018-09-28 | End: 2019-11-12

## 2018-09-28 NOTE — MR AVS SNAPSHOT
"              After Visit Summary   9/28/2018    Cheyenne Beckman    MRN: [id number]           Patient Information     Date Of Birth          1965        Visit Information        Provider Department      9/28/2018 8:00 AM Sabrina Oscar APRN CNP [facility]        Today's Diagnoses     Benign essential hypertension    -  1    Anemia, unspecified type        IGT (impaired glucose tolerance)        Encounter for screening for cardiovascular disorders        Encounter for medication refill           Follow-ups after your visit        Follow-up notes from your care team     Return if symptoms worsen or fail to improve.      Who to contact     If you have questions or need follow up information about today's clinic visit or your schedule please contact [facility] directly at [phone].  Normal or non-critical lab and imaging results will be communicated to you by [employer], letter or phone within 4 business days after the clinic has received the results. If you do not hear from us within 7 days, please contact the clinic through [employer] or phone. If you have a critical or abnormal lab result, we will notify you by phone as soon as possible.  Submit refill requests through [employer] or call your pharmacy and they will forward the refill request to us. Please allow 3 business days for your refill to be completed.          Additional Information About Your Visit        MyChart Information     [employer] lets you send messages to your doctor, view your test results, renew your prescriptions, schedule appointments and more. To sign up, go to [email or web address] . Click on \"Log in\" on the left side of the screen, which will take you to the Welcome page. Then click on \"Sign up Now\" on the right side of the page.     You will be asked to enter the access code listed below, as well as some personal information. Please follow the directions to create your username and password.     Your access " "code is: [email or web address]  Expires: 2018  8:58 AM     Your access code will  in 90 days. If you need help or a new code, please call your [place] clinic or [phone].        Care EveryWhere ID     This is your Care EveryWhere ID. This could be used by other organizations to access your [place] medical records  [id number]        Your Vitals Were     Pulse Respirations Height Last Period BMI (Body Mass Index)       76 12 1.626 m (5' 4\") 2018 (Approximate) 39 kg/m2        Blood Pressure from Last 3 Encounters:   18 118/64   17 102/66   17 112/76    Weight from Last 3 Encounters:   18 103.1 kg (227 lb 3.2 oz)   17 101.6 kg (224 lb)   17 98.6 kg (217 lb 6.4 oz)              We Performed the Following     CBC with Diff/Plt ([facility])     Comp. Metabolic Panel (14) ([facility])     Hemoglobin A1C ([facility])     Lipid Panel ([facility])          Where to get your medicines      These medications were sent to [facility]/pharmacy #[id number] [place], [place] - [address] [facility]  [address] [place] [address]     Phone:  [phone]     lisinopril-hydrochlorothiazide 10-12.5 MG per tablet          Primary Care Provider Office Phone # Fax #    Hayde Jackie Sidhu [phone] [address] [place] [address]        Equal Access to Services     [facility]: Hadii courtney hough hadasho Soari, waaxda luqadaha, qaybta kaalmayuridia maldonado . So [facility] [phone].    ATENCIÓN: Si habla español, tiene a woodruff disposición servicios gratuitos de asistencia lingüística. Llame al [phone].    We comply with applicable federal civil rights laws and [place] laws. We do not discriminate on the basis of race, color, national origin, age, disability, sex, sexual orientation, or gender identity.            Thank you!     Thank you for choosing [facility]  for your care. Our goal is always to provide " you with excellent care. Hearing back from our patients is one way we can continue to improve our services. Please take a few minutes to complete the written survey that you may receive in the mail after your visit with us. Thank you!             Your Updated Medication List - Protect others around you: Learn how to safely use, store and throw away your medicines at www.disposemymeds.org.          This list is accurate as of 9/28/18  8:58 AM.  Always use your most recent med list.                   Brand Name Dispense Instructions for use Diagnosis    ferrous sulfate 325 (65 Fe) MG tablet    IRON     Take 325 mg by mouth daily (with breakfast) Recommend take with vitamin c        lisinopril-hydrochlorothiazide 10-12.5 MG per tablet    PRINZIDE/ZESTORETIC    90 tablet    Take 1 tablet by mouth daily    Encounter for medication refill       omeprazole 40 MG capsule    priLOSEC    90 capsule    TAKE 1 CAPSULE (40 MG) BY MOUTH DAILY    Iron deficiency anemia due to chronic blood loss, Abdominal pain, epigastric, Encounter for long-term (current) use of medications

## 2018-09-28 NOTE — LETTER
October 3, 2018      Cheyenne CALLI Beckman  [address]   [facility] [address]        Dear ,    We are writing to inform you of your test results.HGB slightly low, can continue taking iron daily.  Cholesterol good. CMP good. A1C elevated, but stable, at 6.4.  If you have been good at your diet and that is not working, may consider adding metformin 500 mg twice daily to help bring it down.  Otherwise keep trying to cut out carbs and sugars and increase exercise just a little bit each day, even a 15 min walk.  If you wish to do this I can call in to pharmacy. If you have questions please let me know.    Resulted Orders   Lipid Panel ([facility])   Result Value Ref Range    Cholesterol 166 100 - 199 mg/dL    Triglycerides 97 0 - 149 mg/dL    HDL Cholesterol 49 >39 mg/dL    VLDL Cholesterol Favian 19 5 - 40 mg/dL    LDL Cholesterol Calculated 98 0 - 99 mg/dL    LDL/HDL Ratio 2.0 0.0 - 3.2 ratio      Comment:                                          LDL/HDL Ratio                                              Men  Women                                1/2 Avg.Risk  1.0    1.5                                    Avg.Risk  3.6    3.2                                 2X Avg.Risk  6.2    5.0                                 3X Avg.Risk  8.0    6.1      Narrative    Performed at:  01 - [facility]  [address] [place] [address]  [id number]  : Catracho Reynoso MD, Phone:  [phone]   CBC with Diff/Plt ([facility])   Result Value Ref Range    WBC x10/cmm 6.4 3.8 - 11.0 x10/cmm    % Lymphocytes 39.8 20.5 - 51.1 %    % Monocytes 7.5 1.7 - 9.3 %    % Granulocytes 52.7 42.2 - 75.2 %    RBC x10/cmm 4.10 3.7 - 5.2 x10/cmm    Hemoglobin 11.3 (A) 11.8 - 15.5 g/dl    Hematocrit 34.5 (A) 35 - 46 %    MCV 84.1 80 - 100 fL    MCH 27.5 27.0 - 31.0 pg    MCHC 32.7 (A) 33.0 - 37.0 g/dL    Platelet Count 263 140 - 450 K/uL   Comp. Metabolic Panel (14) ([facility])   Result Value Ref Range    Glucose 113 (H) 65 - 99 mg/dL     Urea Nitrogen 12 6 - 24 mg/dL    Creatinine 0.84 0.57 - 1.00 mg/dL    eGFR If NonAfricn Am 81 >59 mL/min/1.73    eGFR If Africn Am 93 >59 mL/min/1.73    BUN/Creatinine Ratio 14 9 - 23    Sodium 136 134 - 144 mmol/L    Potassium 4.1 3.5 - 5.2 mmol/L    Chloride 103 96 - 106 mmol/L    Total CO2 27 20 - 29 mmol/L    Calcium 9.4 8.7 - 10.2 mg/dL    Protein Total 7.7 6.0 - 8.5 g/dL    Albumin 4.2 3.5 - 5.5 g/dL    Globulin, Total 3.5 1.5 - 4.5 g/dL    A/G Ratio 1.2 1.2 - 2.2    Bilirubin Total 0.3 0.0 - 1.2 mg/dL    Alkaline Phosphatase 105 39 - 117 IU/L    AST 17 0 - 40 IU/L    ALT 18 0 - 32 IU/L    Narrative    Performed at:  01 - [facility] [address], [place], [place]  [id number]  : Catracho Reynoso MD, Phone:  [phone]   Hemoglobin A1C ([facility])   Result Value Ref Range    Hemoglobin A1C 6.4 (H) 4.8 - 5.6 %      Comment:               Prediabetes: 5.7 - 6.4           Diabetes: >6.4           Glycemic control for adults with diabetes: <7.0      Narrative    Performed at:  01 - [facility] [address], [place], [place]  [id number]  : Catracho Reynoso MD, Phone:  [phone]       If you have any questions or concerns, please call the clinic at the number listed above.       Sincerely,        GYPSY Loaiza CNP

## 2018-09-28 NOTE — PROGRESS NOTES
Problem(s) Oriented visit        SUBJECTIVE:                                                    Cheyenne Beckman is a 53 year old female who presents to clinic today for the following health issues : Here for labs and HTN med refills. Also wants A1C checked, has been working on sweets, when eats she eats too much. Has not been taking iron, wants levels checked. Has fibroids, occasional heavy periods, especially first 2 days, periods last 5 days.Occasional swelling in feet after working all day. Has not been needing omeprazole anymore unless eats spicy foods.     Hypertension Follow-up      Outpatient blood pressures at [facility] occasionally. Have been normal.    Low Salt Diet: not monitoring salt      Amount of exercise or physical activity: 2-3 days/week for an average of 30-45 minutes    Problems taking medications regularly: No    Medication side effects: none    Diet: tires to eat extra iron, variety of foods, tries to avoids sweets          Problem list, Medication list, Allergies, and Medical/Social/Surgical histories reviewed in EPIC and updated as appropriate.   Additional history: as documented    ROS:   10 point ROS completed and negative except noted above, including Gen, HEENT, CV, Resp, GI, , MS, Neurologic, Psych    Histories:   Patient Active Problem List   Diagnosis     Benign essential hypertension     Iron deficiency anemia due to chronic blood loss     Past Surgical History:   Procedure Laterality Date     TUBAL LIGATION         Social History   Substance Use Topics     Smoking status: Never Smoker     Smokeless tobacco: Never Used     Alcohol use 0.0 - 0.6 oz/week     0 - 1 Standard drinks or equivalent per week      Comment: social - rarely     Family History   Problem Relation Age of Onset     Hypertension Maternal Grandmother      Cancer Maternal Grandmother      Depression Sister      Diabetes Maternal Uncle      Hypertension Mother            OBJECTIVE:                                         "            /64  Pulse 76  Resp 12  Ht 1.626 m (5' 4\")  Wt 103.1 kg (227 lb 3.2 oz)  LMP 08/28/2018 (Approximate)  BMI 39 kg/m2  Body mass index is 39 kg/(m^2).   APPEARANCE: = Relaxed and in no distress  Conj/Eyelids = noninjected and lids and lashes are without inflammation  PERRLA/Irises = Pupils Round Reactive to Light and Irisis without inflammation  Neck = No anterior or posterior adenopathy appreciated.  Thyroid = Not enlarged and no masses felt  Resp effort = Calm regular breathing  Breath Sounds = Good air movement with no rales or rhonchi in any lung fields  Heart Rate, Rhythm, & sounds (no Murm)  = Regular rate and rhythm with no S3, S4, or murmur appreciated.  Carotid Art's = Pulses full and equal and no bruits appreciated  Abdomen = Soft, nontender, no masses, & bowel sounds in all quadrants  Liver/Spleen = Normal span and no splenomegaly noted  Digits and Nails = FROM in all finger joints, no nail dystrophy  Ext (edema) = No pretibial edema noted or elsewhere  Musculsktl =  Strength and ROM of major joints are within normal limits  SKIN = absent significant rashes or lesions   Recent/Remote Memory = Alert and Oriented x 3  Mood/Affect = Cooperative and interested     ASSESSMENT/PLAN:                                                    1. Benign essential hypertension  Work on heart healthy diet and increasing exercise, even 10-15 minutes at a time  - Comp. Metabolic Panel (14) ([facility])    2. Anemia, unspecified type  - CBC with Diff/Plt ([facility])    3. IGT (impaired glucose tolerance)  Keep working on cutting down sweets  - Hemoglobin A1C ([facility])    4. Encounter for screening for cardiovascular disorders  Work on heart healthy diet and increasing exercise, even 10-15 minutes at a time  - Lipid Panel ([facility])  - Hemoglobin A1C ([facility])    5. Encounter for medication refill  - lisinopril-hydrochlorothiazide (PRINZIDE/ZESTORETIC) 10-12.5 MG per tablet; Take 1 tablet by mouth daily  Dispense: " 90 tablet; Refill: 3    FUTURE APPOINTMENTS:       - Follow-up for annual visit or as needed  Work on weight loss  Regular exercise    The following health maintenance items are reviewed in Epic and correct as of today:  Health Maintenance   Topic Date Due     PHQ-2 Q1 YR  08/07/1979     HIV SCREEN (SYSTEM ASSIGNED)  08/07/1985     INFLUENZA VACCINE (1) 09/01/2018     PAP SCREENING Q3 YR (SYSTEM ASSIGNED)  11/25/2018     MAMMO SCREEN Q2 YR (SYSTEM ASSIGNED)  02/17/2019     TETANUS IMMUNIZATION (SYSTEM ASSIGNED)  08/17/2021     LIPID SCREEN Q5 YR FEMALE (SYSTEM ASSIGNED)  12/15/2021     COLONOSCOPY Q5 YR  02/27/2022       GYPSY Loaiza CNP  [facility]  Family Practice  [facility]  [phone]    For any issues my office # is [phone]

## 2018-09-29 LAB
ALBUMIN SERPL-MCNC: 4.2 G/DL (ref 3.5–5.5)
ALBUMIN/GLOB SERPL: 1.2 {RATIO} (ref 1.2–2.2)
ALP SERPL-CCNC: 105 IU/L (ref 39–117)
ALT SERPL-CCNC: 18 IU/L (ref 0–32)
AST SERPL-CCNC: 17 IU/L (ref 0–40)
BILIRUB SERPL-MCNC: 0.3 MG/DL (ref 0–1.2)
BUN SERPL-MCNC: 12 MG/DL (ref 6–24)
BUN/CREATININE RATIO: 14 (ref 9–23)
CALCIUM SERPL-MCNC: 9.4 MG/DL (ref 8.7–10.2)
CHLORIDE SERPLBLD-SCNC: 103 MMOL/L (ref 96–106)
CHOLEST SERPL-MCNC: 166 MG/DL (ref 100–199)
CREAT SERPL-MCNC: 0.84 MG/DL (ref 0.57–1)
EGFR IF AFRICN AM: 93 ML/MIN/1.73
EGFR IF NONAFRICN AM: 81 ML/MIN/1.73
GLOBULIN, TOTAL: 3.5 G/DL (ref 1.5–4.5)
GLUCOSE SERPL-MCNC: 113 MG/DL (ref 65–99)
HBA1C MFR BLD: 6.4 % (ref 4.8–5.6)
HDLC SERPL-MCNC: 49 MG/DL
LDL/HDL RATIO: 2 RATIO (ref 0–3.2)
LDLC SERPL CALC-MCNC: 98 MG/DL (ref 0–99)
POTASSIUM SERPL-SCNC: 4.1 MMOL/L (ref 3.5–5.2)
PROT SERPL-MCNC: 7.7 G/DL (ref 6–8.5)
SODIUM SERPL-SCNC: 136 MMOL/L (ref 134–144)
TOTAL CO2: 27 MMOL/L (ref 20–29)
TRIGL SERPL-MCNC: 97 MG/DL (ref 0–149)
VLDLC SERPL CALC-MCNC: 19 MG/DL (ref 5–40)

## 2018-10-03 ENCOUNTER — TELEPHONE (OUTPATIENT)
Dept: FAMILY MEDICINE | Facility: CLINIC | Age: 53
End: 2018-10-03

## 2018-10-03 NOTE — TELEPHONE ENCOUNTER
----- Message from GYPSY Rdz CNP sent at 10/3/2018  8:18 AM CDT -----  HGB slightly low, can continue taking iron daily.  Cholesterol good. CMP good. A1C elevated, but stable, at 6.4.  If you have been good at your diet and that is not working, may consider adding metformin 500 mg twice daily to help bring it down.  Otherwise keep trying to cut out carbs and sugars and increase exercise just a little bit each day, even a 15 min walk.  If you wish to do this I can call in to pharmacy. If you have questions please let me know.  ROSCOE Pemberton

## 2018-10-03 NOTE — TELEPHONE ENCOUNTER
Called and left detailed message for patient with results. Letter mailed to patient with results as well. Informed patient to call back if she wishes to pursue the Metformin prescription. Andrey Locke LPN      10/3/2018    11:54 AM

## 2018-12-28 ENCOUNTER — TRANSFERRED RECORDS (OUTPATIENT)
Dept: FAMILY MEDICINE | Facility: CLINIC | Age: 53
End: 2018-12-28

## 2019-05-31 NOTE — TELEPHONE ENCOUNTER
Patient had called and spoken with RN on 3/22 regarding influenza-like symptoms x 2 days - achy, fever up to 101, cough. Denies SOB/wheezing/chest congestion.   Discussed home treatment and contagious as long as running fever. Discussed signs and symptoms of concern and to call if present.     Patient called again today still ill, is starting to feel better (aches and fever subsiding) but called in ill to work on 3/22, 3/23 and today due to illness and is asking for note for employer documenting that she called and we discussed her symptoms/illness. She is planning to RTW Monday 3/27.  Letter done stating that patient called with LEXIE symptoms including fever and was unable to work. Patient will  at  today.   Frances Hopkins RN      
Yes

## 2019-06-06 ENCOUNTER — OFFICE VISIT (OUTPATIENT)
Dept: FAMILY MEDICINE | Facility: CLINIC | Age: 54
End: 2019-06-06

## 2019-06-06 VITALS
SYSTOLIC BLOOD PRESSURE: 132 MMHG | HEART RATE: 99 BPM | BODY MASS INDEX: 39.48 KG/M2 | WEIGHT: 230 LBS | RESPIRATION RATE: 20 BRPM | OXYGEN SATURATION: 98 % | DIASTOLIC BLOOD PRESSURE: 98 MMHG

## 2019-06-06 DIAGNOSIS — R60.0 BILATERAL LEG EDEMA: ICD-10-CM

## 2019-06-06 DIAGNOSIS — Z86.2 HISTORY OF ANEMIA: ICD-10-CM

## 2019-06-06 DIAGNOSIS — E11.9 TYPE 2 DIABETES MELLITUS TREATED WITHOUT INSULIN (H): ICD-10-CM

## 2019-06-06 DIAGNOSIS — I10 BENIGN ESSENTIAL HYPERTENSION: Primary | ICD-10-CM

## 2019-06-06 LAB
% GRANULOCYTES: 61.6 % (ref 42.2–75.2)
HCT VFR BLD AUTO: 36.4 % (ref 35–46)
HEMOGLOBIN: 11.9 G/DL (ref 11.8–15.5)
LYMPHOCYTES NFR BLD AUTO: 32.6 % (ref 20.5–51.1)
MCH RBC QN AUTO: 27.9 PG (ref 27–31)
MCHC RBC AUTO-ENTMCNC: 32.7 G/DL (ref 33–37)
MCV RBC AUTO: 85.4 FL (ref 80–100)
MONOCYTES NFR BLD AUTO: 5.8 % (ref 1.7–9.3)
PLATELET # BLD AUTO: 259 K/UL (ref 140–450)
RBC # BLD AUTO: 4.26 X10/CMM (ref 3.7–5.2)
WBC # BLD AUTO: 5.8 X10/CMM (ref 3.8–11)

## 2019-06-06 PROCEDURE — 36415 COLL VENOUS BLD VENIPUNCTURE: CPT | Performed by: FAMILY MEDICINE

## 2019-06-06 PROCEDURE — 80053 COMPREHEN METABOLIC PANEL: CPT | Mod: 90 | Performed by: FAMILY MEDICINE

## 2019-06-06 PROCEDURE — 85025 COMPLETE CBC W/AUTO DIFF WBC: CPT | Performed by: FAMILY MEDICINE

## 2019-06-06 PROCEDURE — 99214 OFFICE O/P EST MOD 30 MIN: CPT | Performed by: FAMILY MEDICINE

## 2019-06-06 PROCEDURE — 83036 HEMOGLOBIN GLYCOSYLATED A1C: CPT | Mod: 90 | Performed by: FAMILY MEDICINE

## 2019-06-06 NOTE — PROGRESS NOTES
Problem(s) Oriented visit        SUBJECTIVE:                                                    Cheyenne Beckman is a 53 year old female who presents to clinic today for sensation of heartbeat in her ears, headache, and leg edema. She was diagnosed with diabetes in 2015 with initial hemoglobin A1c of 10.1. She has been able to control this with diet. She takes low dose Prinzide for HTN, which is typically well controlled. She does not follow a low salt diet. It has been hot lately and she has not had good hydration for a few days. She denies fever. No chest pain or pressure. No shortness of breath.       Problem list, Medication list, Allergies, and Medical/Social/Surgical histories reviewed in EPIC and updated as appropriate.   Additional history: as documented    ROS:  5 point ROS completed and negative except noted above, including Gen, CV, Resp, GI, MS      Histories:   Patient Active Problem List   Diagnosis     Benign essential hypertension     Iron deficiency anemia due to chronic blood loss     Obesity (BMI 35.0-39.9) with comorbidity (H)     Past Surgical History:   Procedure Laterality Date     TUBAL LIGATION         Social History     Tobacco Use     Smoking status: Never Smoker     Smokeless tobacco: Never Used   Substance Use Topics     Alcohol use: Yes     Alcohol/week: 0.0 - 0.6 oz     Comment: social - rarely     Family History   Problem Relation Age of Onset     Hypertension Maternal Grandmother      Cancer Maternal Grandmother      Depression Sister      Diabetes Maternal Uncle      Hypertension Mother            OBJECTIVE:                                                    BP (!) 132/98   Pulse 99   Resp 20   Wt 104.3 kg (230 lb)   SpO2 98%   BMI 39.48 kg/m    Body mass index is 39.48 kg/m .   GENERAL APPEARANCE: Alert, no acute distress  EYES: PERRL, EOM normal, conjunctiva and lids normal  HENT: Ears and TMs normal, oral mucosa and posterior oropharynx normal  NECK: No adenopathy,masses  or thyromegaly  RESP: lungs clear to auscultation   CV: normal rate, regular rhythm, no murmur or gallop  ABDOMEN: soft, no organomegaly, masses or tenderness  MS: normal pedal pulses, mild non-pitting edema.  NEURO: Alert, oriented, speech and mentation normal  PSYCH: mentation appears normal, affect and mood normal   Labs Resulted Today:   Results for orders placed or performed in visit on 06/06/19   Hemoglobin A1C ([facility])   Result Value Ref Range    Hemoglobin A1C 6.6 (H) 4.8 - 5.6 %    Narrative    Performed at:  01 - [facility] [address], [place], [place]  [id number]  : Catracho Reynoso MD, Phone:  [phone]   Comp. Metabolic Panel (14) ([facility])   Result Value Ref Range    Glucose 94 65 - 99 mg/dL    Urea Nitrogen 12 6 - 24 mg/dL    Creatinine 0.97 0.57 - 1.00 mg/dL    eGFR If NonAfricn Am 68 >59 mL/min/1.73    eGFR If Africn Am 78 >59 mL/min/1.73    BUN/Creatinine Ratio 12 9 - 23    Sodium 140 134 - 144 mmol/L    Potassium 4.3 3.5 - 5.2 mmol/L    Chloride 99 96 - 106 mmol/L    Total CO2 26 20 - 29 mmol/L    Calcium 9.7 8.7 - 10.2 mg/dL    Protein Total 8.2 6.0 - 8.5 g/dL    Albumin 4.3 3.5 - 5.5 g/dL    Globulin, Total 3.9 1.5 - 4.5 g/dL    A/G Ratio 1.1 (L) 1.2 - 2.2    Bilirubin Total 0.3 0.0 - 1.2 mg/dL    Alkaline Phosphatase 117 39 - 117 IU/L    AST 22 0 - 40 IU/L    ALT 19 0 - 32 IU/L    Narrative    Performed at:  01 - [facility]  [employer] [place], [place]  [id number]  : Catracho Reynoso MD, Phone:  [phone]   CBC with Diff/Plt ([facility])   Result Value Ref Range    WBC x10/cmm 5.8 3.8 - 11.0 x10/cmm    % Lymphocytes 32.6 20.5 - 51.1 %    % Monocytes 5.8 1.7 - 9.3 %    % Granulocytes 61.6 42.2 - 75.2 %    RBC x10/cmm 4.26 3.7 - 5.2 x10/cmm    Hemoglobin 11.9 11.8 - 15.5 g/dl    Hematocrit 36.4 35 - 46 %    MCV 85.4 80 - 100 fL    MCH 27.9 27.0 - 31.0 pg    MCHC 32.7 (A) 33.0 - 37.0 g/dL    Platelet Count 259 140 - 450 K/uL     ASSESSMENT/PLAN:                                                         Cheyenne was seen today for plugged ears and swelling.    Diagnoses and all orders for this visit:    Benign essential hypertension  -     Comp. Metabolic Panel (14) ([facility])    Bilateral leg edema  -     Comp. Metabolic Panel (14) ([facility])    Type 2 diabetes mellitus treated without insulin (H)  -     Hemoglobin A1C ([facility])  -     Comp. Metabolic Panel (14) ([facility])    History of anemia  -     CBC with Diff/Plt ([facility])    She will give trial of good hydration and low salt to help the recurrent edema. She will monitor her blood pressure at home and if it remains elevated as it is today she will double her dose of Prinzide. I suspect the ear sensation has to do with ET dysfunction and means for adjusting ear pressure were discussed.        Patient Instructions   Refer to Amber for DM teaching.           The following health maintenance items are reviewed in Epic and correct as of today:  Health Maintenance   Topic Date Due     MICROALBUMIN  1965     DIABETIC FOOT EXAM  1965     EYE EXAM  1965     HIV SCREENING  08/07/1982     PREVENTIVE CARE VISIT  08/17/2012     ZOSTER IMMUNIZATION (1 of 2) 08/07/2017     PAP  11/25/2018     TSH W/FREE T4 REFLEX  12/15/2018     MAMMO SCREENING  02/17/2019     INFLUENZA VACCINE (Season Ended) 09/01/2019     A1C  09/06/2019     LIPID  09/28/2019     BMP  06/06/2020     DTAP/TDAP/TD IMMUNIZATION (2 - Td) 08/17/2021     COLONOSCOPY  02/27/2022     PHQ-2  Completed     IPV IMMUNIZATION  Aged Out     MENINGITIS IMMUNIZATION  Aged Out       Hayde Sidhu MD  [facility]  Family Practice  [facility]  [phone]    For any issues my office # is [phone]

## 2019-06-06 NOTE — LETTER
[facility]  [address] [place] [address] [phone]      June 6, 2019        RE:  Cheyenne MCCURDY Karuna  [address]   [facility] [address]  [birth year]            To Whom it May Concern:    Cheyenne has recurrent issues with ear pain that is exacerbated by pressurized rooms. Please allow her to work in a non-pressurized room.          Sincerely,        Hayde Sidhu M.D.

## 2019-06-06 NOTE — LETTER
[facility] [address] [place]  [address]  Phone: [phone]    Nallely 10, 2019      Cheyenne Beckman  [address]   [facility] [address]              Dear Cheyenne,    The results from your recent visit showed Blood counts are normal.   Normal glucose, electrolytes, kidney function, and liver function.   A1c is up just slightly.        Sincerely,     Hayde Sidhu M.D.    Results for orders placed or performed in visit on 06/06/19   Hemoglobin A1C ([facility])   Result Value Ref Range    Hemoglobin A1C 6.6 (H) 4.8 - 5.6 %    Narrative    Performed at:  01 - [facility]  [employer] [place], [place]  [id number]  : Catracho Reynoso MD, Phone:  [phone]   Comp. Metabolic Panel (14) ([facility])   Result Value Ref Range    Glucose 94 65 - 99 mg/dL    Urea Nitrogen 12 6 - 24 mg/dL    Creatinine 0.97 0.57 - 1.00 mg/dL    eGFR If NonAfricn Am 68 >59 mL/min/1.73    eGFR If Africn Am 78 >59 mL/min/1.73    BUN/Creatinine Ratio 12 9 - 23    Sodium 140 134 - 144 mmol/L    Potassium 4.3 3.5 - 5.2 mmol/L    Chloride 99 96 - 106 mmol/L    Total CO2 26 20 - 29 mmol/L    Calcium 9.7 8.7 - 10.2 mg/dL    Protein Total 8.2 6.0 - 8.5 g/dL    Albumin 4.3 3.5 - 5.5 g/dL    Globulin, Total 3.9 1.5 - 4.5 g/dL    A/G Ratio 1.1 (L) 1.2 - 2.2    Bilirubin Total 0.3 0.0 - 1.2 mg/dL    Alkaline Phosphatase 117 39 - 117 IU/L    AST 22 0 - 40 IU/L    ALT 19 0 - 32 IU/L    Narrative    Performed at:  01 - [facility]  [place], [place]  [id number]  : Catracho Reynoso MD, Phone:  [phone]   CBC with Diff/Plt ([facility])   Result Value Ref Range    WBC x10/cmm 5.8 3.8 - 11.0 x10/cmm    % Lymphocytes 32.6 20.5 - 51.1 %    % Monocytes 5.8 1.7 - 9.3 %    % Granulocytes 61.6 42.2 - 75.2 %    RBC x10/cmm 4.26 3.7 - 5.2 x10/cmm    Hemoglobin 11.9 11.8 - 15.5 g/dl    Hematocrit 36.4 35 - 46 %    MCV 85.4 80 - 100 fL    MCH 27.9 27.0 - 31.0 pg    MCHC 32.7 (A) 33.0 - 37.0 g/dL     Platelet Count 259 140 - 450 K/uL

## 2019-06-08 LAB
ALBUMIN SERPL-MCNC: 4.3 G/DL (ref 3.5–5.5)
ALBUMIN/GLOB SERPL: 1.1 {RATIO} (ref 1.2–2.2)
ALP SERPL-CCNC: 117 IU/L (ref 39–117)
ALT SERPL-CCNC: 19 IU/L (ref 0–32)
AST SERPL-CCNC: 22 IU/L (ref 0–40)
BILIRUB SERPL-MCNC: 0.3 MG/DL (ref 0–1.2)
BUN SERPL-MCNC: 12 MG/DL (ref 6–24)
BUN/CREATININE RATIO: 12 (ref 9–23)
CALCIUM SERPL-MCNC: 9.7 MG/DL (ref 8.7–10.2)
CHLORIDE SERPLBLD-SCNC: 99 MMOL/L (ref 96–106)
CREAT SERPL-MCNC: 0.97 MG/DL (ref 0.57–1)
EGFR IF AFRICN AM: 78 ML/MIN/1.73
EGFR IF NONAFRICN AM: 68 ML/MIN/1.73
GLOBULIN, TOTAL: 3.9 G/DL (ref 1.5–4.5)
GLUCOSE SERPL-MCNC: 94 MG/DL (ref 65–99)
HBA1C MFR BLD: 6.6 % (ref 4.8–5.6)
POTASSIUM SERPL-SCNC: 4.3 MMOL/L (ref 3.5–5.2)
PROT SERPL-MCNC: 8.2 G/DL (ref 6–8.5)
SODIUM SERPL-SCNC: 140 MMOL/L (ref 134–144)
TOTAL CO2: 26 MMOL/L (ref 20–29)

## 2019-11-11 DIAGNOSIS — Z76.0 ENCOUNTER FOR MEDICATION REFILL: ICD-10-CM

## 2019-11-12 RX ORDER — LISINOPRIL/HYDROCHLOROTHIAZIDE 10-12.5 MG
1 TABLET ORAL DAILY
Qty: 30 TABLET | Refills: 0 | Status: SHIPPED | OUTPATIENT
Start: 2019-11-12 | End: 2019-12-09

## 2019-12-09 DIAGNOSIS — Z76.0 ENCOUNTER FOR MEDICATION REFILL: ICD-10-CM

## 2019-12-09 RX ORDER — LISINOPRIL/HYDROCHLOROTHIAZIDE 10-12.5 MG
1 TABLET ORAL DAILY
Qty: 90 TABLET | Refills: 1 | Status: SHIPPED | OUTPATIENT
Start: 2019-12-09 | End: 2020-06-18

## 2020-04-03 ENCOUNTER — VIRTUAL VISIT (OUTPATIENT)
Dept: FAMILY MEDICINE | Facility: CLINIC | Age: 55
End: 2020-04-03

## 2020-04-03 DIAGNOSIS — Z79.899 ENCOUNTER FOR LONG-TERM (CURRENT) USE OF MEDICATIONS: ICD-10-CM

## 2020-04-03 DIAGNOSIS — D50.0 IRON DEFICIENCY ANEMIA DUE TO CHRONIC BLOOD LOSS: ICD-10-CM

## 2020-04-03 DIAGNOSIS — R10.13 ABDOMINAL PAIN, EPIGASTRIC: ICD-10-CM

## 2020-04-03 PROBLEM — E11.9 DIABETES MELLITUS, TYPE 2 (H): Status: ACTIVE | Noted: 2020-04-03

## 2020-04-03 PROCEDURE — 99213 OFFICE O/P EST LOW 20 MIN: CPT | Mod: GT | Performed by: NURSE PRACTITIONER

## 2020-04-03 RX ORDER — OMEPRAZOLE 40 MG/1
CAPSULE, DELAYED RELEASE ORAL
Qty: 90 CAPSULE | Refills: 2 | Status: SHIPPED | OUTPATIENT
Start: 2020-04-03 | End: 2023-04-21

## 2020-04-03 NOTE — PROGRESS NOTES
"Problem(s) Oriented visit        SUBJECTIVE:                                                    Cheyenne Beckman is a 54 year old female who presents to clinic today for the following health issues :  The patient has been notified of following:     \"This video visit will be conducted via a call between you and your physician/provider. We have found that certain health care needs can be provided without the need for an in-person physical exam.  This service lets us provide the care you need with a video conversation.  If a prescription is necessary we can send it directly to your pharmacy.  If lab work is needed we can place an order for that and you can then stop by our lab to have the test done at a later time.    If during the course of the call the physician/provider feels a video visit is not appropriate, you will not be charged for this service.\"     Physician has received verbal consent for a Video Visit from the patient? Yes    Has had indigestion, causing sore throat. Does not wake up with bad taste in the back or throat, no cough, sometimes has a rattleing in her throat. Stopped taking omeprazole due to concerns about dependence and side effects.     Allergies:  Usually takes loratadine for seasonal allergies.  Concerned about occasional wheezing without shortness of breath, which is new to her. She will follow up if wheezing continues for more than a few weeks or if she becomes short of breath.     Problem list, Medication list, Allergies, and Medical/Social/Surgical histories reviewed in EPIC and updated as appropriate.   Additional history: as documented    ROS:  5 point ROS completed and negative except noted above, including Gen, CV, Resp, GI, MS    Histories:   Patient Active Problem List   Diagnosis     Benign essential hypertension     Iron deficiency anemia due to chronic blood loss     Obesity (BMI 35.0-39.9) with comorbidity (H)     Diabetes mellitus, type 2 (H)     Past Surgical History: "   Procedure Laterality Date     TUBAL LIGATION         Social History     Tobacco Use     Smoking status: Never Smoker     Smokeless tobacco: Never Used   Substance Use Topics     Alcohol use: Yes     Alcohol/week: 0.0 - 1.0 standard drinks     Comment: social - rarely     Family History   Problem Relation Age of Onset     Hypertension Maternal Grandmother      Cancer Maternal Grandmother      Depression Sister      Diabetes Maternal Uncle      Hypertension Mother            OBJECTIVE:                                                    There were no vitals taken for this visit.  There is no height or weight on file to calculate BMI.   APPEARANCE: = Relaxed and in no distress  Conj/Eyelids = noninjected and lids and lashes are without inflammation  Ears/Nose = External structures and Nares have usual shape and form  Lips/Teeth/Gums = No lesions seen, good dentition, and gums seem healthy  Resp effort = Calm regular breathing  Recent/Remote Memory = Alert and Oriented x 3  Mood/Affect = Cooperative and interested     ASSESSMENT/PLAN:                                                        Cheyenne was seen today for recheck medication.    Diagnoses and all orders for this visit:    Iron deficiency anemia due to chronic blood loss  -     omeprazole (PRILOSEC) 40 MG DR capsule; TAKE 1 CAPSULE (40 MG) BY MOUTH DAILY    Abdominal pain, epigastric  -     omeprazole (PRILOSEC) 40 MG DR capsule; TAKE 1 CAPSULE (40 MG) BY MOUTH DAILY  Discussed the functional nature of this condition regarding what they eat, how they eat, when they eat, and how much they eat as all contributing to gastroesophageal symptoms.    Recommend anti-reflux diet and eating patterns emphasizing smaller more frequent meals and timing relative to bedtime.  Also recommend avoiding tomatoes, onions, spicy foods, caffeine, or any other food/beverage that cause increased reflux.    If symptoms not controlled on current therapies, then need to return for further  evaluation and consideration of further studies.  You can take this medication every three days or every other day, as you are concerned about taking it daily. Please call and let me know if your symptoms do not improve. If taking it intermittently is ineffective I suggest taking it everyday.     Encounter for long-term (current) use of medications  -     omeprazole (PRILOSEC) 40 MG DR capsule; TAKE 1 CAPSULE (40 MG) BY MOUTH DAILY        ASSESSMENT/PLAN:       The following health maintenance items are reviewed in Epic and correct as of today:  Health Maintenance   Topic Date Due     MICROALBUMIN  1965     DIABETIC FOOT EXAM  1965     EYE EXAM  1965     HIV SCREENING  08/07/1982     PNEUMOCOCCAL IMMUNIZATION 19-64 MEDIUM RISK (1 of 1 - PPSV23) 08/07/1986     PREVENTIVE CARE VISIT  08/17/2012     ZOSTER IMMUNIZATION (1 of 2) 08/07/2017     PAP  11/25/2018     MAMMO SCREENING  02/17/2019     INFLUENZA VACCINE (1) 09/01/2019     A1C  09/06/2019     LIPID  09/28/2019     PHQ-2  01/01/2020     BMP  06/06/2020     DTAP/TDAP/TD IMMUNIZATION (2 - Td) 08/17/2021     COLORECTAL CANCER SCREENING  02/27/2022     IPV IMMUNIZATION  Aged Out     MENINGITIS IMMUNIZATION  Aged Out       Cece Jaramillo NP  [facility]  Family Practice  [facility]  [phone]    For any issues my office # is [phone]

## 2020-06-09 ENCOUNTER — OFFICE VISIT (OUTPATIENT)
Dept: FAMILY MEDICINE | Facility: CLINIC | Age: 55
End: 2020-06-09

## 2020-06-09 VITALS
WEIGHT: 235 LBS | SYSTOLIC BLOOD PRESSURE: 134 MMHG | OXYGEN SATURATION: 98 % | DIASTOLIC BLOOD PRESSURE: 76 MMHG | BODY MASS INDEX: 40.34 KG/M2 | TEMPERATURE: 98 F | RESPIRATION RATE: 16 BRPM | HEART RATE: 73 BPM

## 2020-06-09 DIAGNOSIS — L72.9 CYST OF SKIN: Primary | ICD-10-CM

## 2020-06-09 PROCEDURE — 99213 OFFICE O/P EST LOW 20 MIN: CPT | Performed by: FAMILY MEDICINE

## 2020-06-09 NOTE — PROGRESS NOTES
SUBJECTIVE:                                                    Cheyenne Beckman is a 54 year old female who presents to clinic today for evaluation.  She reports a longstanding cyst on her left axilla.  This has apparently been imaged with ultrasound in the past through [facility].  It was apparently benign.  4 days ago it ruptured spontaneously and drained dark brown material.  It was mildly symptomatic but never very bothersome.  She feels well otherwise.     Of note, she is due for DM follow up and CPE.      Problem list, Medication list, Allergies, and Medical/Social/Surgical histories reviewed in EPIC and updated as appropriate.   Additional history: as documented    ROS:    A 5 system review was completed and is as noted in HPI and otherwise negative.      Histories:   Patient Active Problem List   Diagnosis     Benign essential hypertension     Iron deficiency anemia due to chronic blood loss     Obesity (BMI 35.0-39.9) with comorbidity (H)     Diabetes mellitus, type 2 (H)     Past Surgical History:   Procedure Laterality Date     TUBAL LIGATION         Social History     Tobacco Use     Smoking status: Never Smoker     Smokeless tobacco: Never Used   Substance Use Topics     Alcohol use: Yes     Alcohol/week: 0.0 - 1.0 standard drinks     Comment: social - rarely     Family History   Problem Relation Age of Onset     Hypertension Maternal Grandmother      Cancer Maternal Grandmother      Depression Sister      Diabetes Maternal Uncle      Hypertension Mother            OBJECTIVE:                                                    /76   Pulse 73   Temp 98  F (36.7  C)   Resp 16   Wt 106.6 kg (235 lb)   SpO2 98%   BMI 40.34 kg/m    Body mass index is 40.34 kg/m .     General: Well appearing, NAD  Oropharynx: Clear  Skin: Healing annular lesion left axilla.  No surrounding erythema.  No induration or fluctuance.  Psych: Normal mood and affect         ASSESSMENT/PLAN:                                                           Cyst of skin: healing well.  Reassurance.  Will request imaging from [facility].  Follow up prn.    HCM: advised to schedule CPE and DM follow up soon.        Germán Lowe MD  [facility]

## 2020-06-18 DIAGNOSIS — Z76.0 ENCOUNTER FOR MEDICATION REFILL: ICD-10-CM

## 2020-06-18 RX ORDER — LISINOPRIL/HYDROCHLOROTHIAZIDE 10-12.5 MG
1 TABLET ORAL DAILY
Qty: 90 TABLET | Refills: 3 | Status: SHIPPED | OUTPATIENT
Start: 2020-06-18 | End: 2021-06-14

## 2020-07-02 ENCOUNTER — OFFICE VISIT (OUTPATIENT)
Dept: FAMILY MEDICINE | Facility: CLINIC | Age: 55
End: 2020-07-02

## 2020-07-02 VITALS
HEART RATE: 80 BPM | OXYGEN SATURATION: 97 % | HEIGHT: 63 IN | DIASTOLIC BLOOD PRESSURE: 88 MMHG | RESPIRATION RATE: 16 BRPM | BODY MASS INDEX: 41.82 KG/M2 | WEIGHT: 236 LBS | TEMPERATURE: 97.5 F | SYSTOLIC BLOOD PRESSURE: 134 MMHG

## 2020-07-02 DIAGNOSIS — I10 ESSENTIAL HYPERTENSION: ICD-10-CM

## 2020-07-02 DIAGNOSIS — M79.672 PAIN OF LEFT HEEL: ICD-10-CM

## 2020-07-02 DIAGNOSIS — Z12.31 ENCOUNTER FOR SCREENING MAMMOGRAM FOR BREAST CANCER: ICD-10-CM

## 2020-07-02 DIAGNOSIS — E11.9 TYPE 2 DIABETES MELLITUS WITHOUT COMPLICATION, WITHOUT LONG-TERM CURRENT USE OF INSULIN (H): Primary | ICD-10-CM

## 2020-07-02 DIAGNOSIS — E66.01 MORBID OBESITY (H): ICD-10-CM

## 2020-07-02 LAB
A/C RATIO MG/G: ABNORMAL MG/G
ALBUMIN (URINE) MG/L: 80 MG/L
INTERPRETATION: ABNORMAL
URINE CREATININE MG/DL - QUEST: 200 MG/DL

## 2020-07-02 PROCEDURE — 82570 ASSAY OF URINE CREATININE: CPT | Performed by: NURSE PRACTITIONER

## 2020-07-02 PROCEDURE — 83036 HEMOGLOBIN GLYCOSYLATED A1C: CPT | Mod: 90 | Performed by: NURSE PRACTITIONER

## 2020-07-02 PROCEDURE — 36415 COLL VENOUS BLD VENIPUNCTURE: CPT | Performed by: NURSE PRACTITIONER

## 2020-07-02 PROCEDURE — 80053 COMPREHEN METABOLIC PANEL: CPT | Mod: 90 | Performed by: NURSE PRACTITIONER

## 2020-07-02 PROCEDURE — 99207 C FOOT EXAM  NO CHARGE: CPT | Performed by: NURSE PRACTITIONER

## 2020-07-02 PROCEDURE — 99214 OFFICE O/P EST MOD 30 MIN: CPT | Performed by: NURSE PRACTITIONER

## 2020-07-02 PROCEDURE — 80061 LIPID PANEL: CPT | Mod: 90 | Performed by: NURSE PRACTITIONER

## 2020-07-02 PROCEDURE — 82044 UR ALBUMIN SEMIQUANTITATIVE: CPT | Performed by: NURSE PRACTITIONER

## 2020-07-02 PROCEDURE — 93050 ART PRESSURE WAVEFORM ANALYS: CPT | Performed by: NURSE PRACTITIONER

## 2020-07-02 ASSESSMENT — MIFFLIN-ST. JEOR: SCORE: 1653.58

## 2020-07-02 NOTE — PATIENT INSTRUCTIONS
Patient Education     Plantar Fasciitis  Plantar fasciitis is a painful swelling of the plantar fascia. The plantar fascia is a thick, fibrous layer of tissue that covers the bones on the bottom of your foot. It supports the foot bones in an arched position.  Plantar fasciitis can happen gradually or suddenly. It usually affects one foot at a time. Heel pain can be sharp, like a knife sticking into the bottom of your foot. You may feel pain after exercising, long-distance jogging, stair climbing, long periods of standing, or after standing up.  Risk factors include: non-active lifestyle, arthritis, diabetes, obesity or recent weight gain, flat foot, high arch. Wearing high heels, loose shoes, or shoes with poor arch support for long periods of time adds to the risk. This problem is commonly found in runners and dancers. It also found in people who stand on hard surfaces for long periods of time.  Foot pain from this condition is usually worse in the morning. But it often improves with walking. By the end of the day there may be a dull aching. Treatment requires short-term rest and controlling swelling. It may take up to 9 months before all symptoms go away. Rarely, a steroid injection into the foot, or surgery, may be needed.  Home care    If you are overweight, lose weight to help healing.    Choose supportive shoes with good arch support and shock absorbency. Replace athletic shoes when they become worn out. Don t walk or run barefoot.    Premade or custom-fitted shoe inserts may be helpful. Inserts made of silicone seem to be the most effective. Custom-made inserts can be provided by foot specialist, physical therapist, or orthopedist.    Premade or custom-made night splints keep the heel stretched out while you sleep. They may prevent morning pain.    Limit activities that stress the feet: jogging, prolonged standing or walking, contact sports, etc.    First thing in the morning and before sports, stretch the  bottom of your feet. Gently flex your ankle so the toes move toward your knee.    Icing may help control heel pain. Apply an ice pack to the heel for 10 to 20 minutes as a preventive. Or ice your heel after a severe flare-up of symptoms. You may repeat this every 1 to 2 hours as needed.    You may use over-the-counter pain medicine to control pain, unless another medicine was prescribed. Anti-inflammatory pain medicines, such as ibuprofen or naproxen, may work better than acetaminophen. If you have chronic liver or kidney disease or ever had a stomach ulcer or gastrointestinal bleeding, talk with your healthcare provider before using these medicines.  Follow-up care  Follow up with your healthcare provider, or as advised.  Call for an appointment if pain worsens or there is no relief after a few weeks of home treatment. Shoe inserts, a night splint, or a special boot may be required.  If X-rays were taken, you will be told of any new findings that may affect your care.  When to seek medical advice  Call your healthcare provider right away if any of these occur:    Foot swelling    Redness or warmth with increasing pain  Date Last Reviewed: 5/1/2018 2000-2019 [employer]. [address] [place] [address]. All rights reserved. This information is not intended as a substitute for professional medical care. Always follow your healthcare professional's instructions.         Patient Education     Toe Extension (Flexibility)    These instructions are for your right foot. Switch sides for your left foot.  1. Sit in a chair. Rest your right ankle on your left knee.  2. Hold your toes with your right hand. Gently bend the toes backward. Feel a stretch in the undersides of the toes and ball of the foot. Hold for 30 to 60 seconds.  3. Then gently bend the toes in the other direction. Gently press on them until your foot is pointed. Hold for 30 to 60 seconds.  4. Repeat 5 times, or as instructed.  Date  Last Reviewed: 5/1/2016 2000-2019 [employer]. [address] [place] [address], [place] [address]. All rights reserved. This information is not intended as a substitute for professional medical care. Always follow your healthcare professional's instructions.           For the swelling: compression stockings, limiting sodium, elevating legs at night    Diabetes: Please see the eye doctor, limit the sugars, and work on exercise, see the nutritionist

## 2020-07-02 NOTE — PROGRESS NOTES
Problem(s) Oriented visit        SUBJECTIVE:                                                    Cheyenne Beckman is a 54 year old female who presents to clinic today for the following health issues :    DM follow up: Diagnosed with T2DM, initially had hgb A1C 10.1 in 2015, but has been able to control DM with diet and exercise. She has never taken medication for this. 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 Reports that she does not eat much, but when she does eat she tends to eat sweets. 24h diet recall includes breakfast sandwich with egg and sausage, fish, fries, and fried chicken. States she normally takes in green vegetables as well. Not on statin or aspirin and declines these medications. She also declines PPSV23. She is due for annual eye exam, foot exam, and microalbumin. No polydipsia/polyuria, unintended weight loss, neuropathy. Open to seeing a dietitian.      HTN: Not checking BPs outside the clinic. No problems with medications, taking regularly. Has some swelling in her feet occasionally. No SOB, chest pain or pressure, orthopnea, PND, neurologic changes, claudication.     Up to date on mammogram, breast exam, and Pap through Dr. Rachel    Occasional left heel pain, improves when she wears supportive shoes    Problem list, Medication list, Allergies, and Medical/Social/Surgical histories reviewed in [facility] and updated as appropriate.   Additional history: as documented    ROS:  General, CV, resp, GI, MSK, endo, neuro negative except as listed per HPI    Histories:   Patient Active Problem List   Diagnosis     Benign essential hypertension     Iron deficiency anemia due to chronic blood loss     Obesity (BMI 35.0-39.9) with comorbidity (H)     Diabetes mellitus, type 2 (H)     Past Surgical History:   Procedure Laterality Date     TUBAL LIGATION    "      Social History     Tobacco Use     Smoking status: Never Smoker     Smokeless tobacco: Never Used   Substance Use Topics     Alcohol use: Yes     Alcohol/week: 0.0 - 1.0 standard drinks     Comment: social - rarely     Family History   Problem Relation Age of Onset     Hypertension Maternal Grandmother      Cancer Maternal Grandmother      Depression Sister      Diabetes Maternal Uncle      Hypertension Mother          Current Outpatient Medications   Medication Sig Dispense Refill     lisinopril-hydrochlorothiazide (ZESTORETIC) 10-12.5 MG tablet Take 1 tablet by mouth daily 90 tablet 3     omeprazole (PRILOSEC) 40 MG DR capsule TAKE 1 CAPSULE (40 MG) BY MOUTH DAILY 90 capsule 2     ferrous sulfate (IRON) 325 (65 FE) MG tablet Take 325 mg by mouth daily (with breakfast) Recommend take with vitamin c         OBJECTIVE:                                                    Physical Exam:    BP (!) 141/80   Pulse 80   Temp 97.5  F (36.4  C) (Skin)   Resp 16   Ht 1.607 m (5' 3.25\")   Wt 107 kg (236 lb)   LMP 06/25/2020 (Exact Date)   SpO2 97%   BMI 41.48 kg/m      CONSTITUTIONAL: Alert non-toxic appearing female in no acute distress  HEAD: Normocephalic, atraumatic  EYES: PERRLA; no scleral icterus; sclera without injection  RESPIRATORY: Lungs clear to auscultation, respirations unlabored  CV: Regular rate and rhythm, S1S2, no clicks, murmurs, rubs, or gallops; bilateral lower extremities with trace pedal edema equal bilaterally, dorsalis pedis pulses 2+ bilaterally  GASTROINTESTINAL: Normoactive bowel sounds x4 quadrants, abdomen obese, soft, non-distended, and non-tender to palpation without masses or organomegaly  MUSCULOSKELETAL: Moves all extremities, no obvious muscle wasting; no focal tenderness to palpation of left foot or heel, no gross abnormalities visualized  NEUROLOGIC: No gross deficits, normal gait  SKIN: Appropriate color for race, warm and dry, no rashes or lesions to unclothed " skin  PSYCHIATRIC: Pleasant and interactive, affect bright, makes appropriate eye contact, thought process logical  Diabetic foot exam: normal DP and PT pulses, no trophic changes or ulcerative lesions, normal sensory exam and normal monofilament exam         ASSESSMENT/PLAN:                                                        Cheyenne was seen today for diabetes.    Diagnoses and all orders for this visit:    Type 2 diabetes mellitus without complication, without long-term current use of insulin (H)  -     C ART PRESS WAVEFORM ANALYS CENTRAL ART PRESSURE  -     C FOOT EXAM  NO CHARGE  -     Microalbumin ([facility])  -     OPHTHALMOLOGY ADULT REFERRAL  -     Lipid Panel ([facility])  -     Comp. Metabolic Panel (14) ([facility])  -     Hemoglobin A1C ([facility])  -     NUTRITION REFERRAL  -     STATIN NOT PRESCRIBED (INTENTIONAL); Please choose reason not prescribed, below    Diagnosis x5 years, but it seems she does not have a good grasp on importance of diabetes control and sequelae of uncontrolled diabetes. Much time was spent reviewing pathophysiology of diabetes, measures to control diabetes, and consequences of poor control. Awaiting A1C, but suspect this will be elevated given her weight gain. She is adamant that she does not want to start any new medication- we reviewed healthy diet, exercise, and weight loss. She is open to seeing a dietitian, referral placed. Reviewed metformin, aspirin, statin, PPSV23 vaccine and she declines these. Diabetic foot exam WNL today. Referral to ophthalmology. Recheck in 3-6 months pending results.    Morbid obesity (H)    Likely impacting both diabetes and HTN as well as peripheral edema- reviewed importance of diet, exercise, and weight loss.    Essential hypertension    Control is fair- recommend lifestyle changes as she does not want to add in any medication    Pain of left heel    Suspect plantar fasciitis, reviewed management- to call if symptoms fail to improve with conservative  measures    Encounter for screening mammogram for breast cancer  -     MAMMO -  Screening Digital Bilateral (FUTURE/[facility]); Future        Patient needs assistance with ADLs: none identified today  Patient needs assistance with iADLs: none identified today    The following health maintenance items are reviewed in Epic and correct as of today:  Health Maintenance   Topic Date Due     MICROALBUMIN  1965     DIABETIC FOOT EXAM  1965     EYE EXAM  1965     HIV SCREENING  08/07/1982     PNEUMOCOCCAL IMMUNIZATION 19-64 MEDIUM RISK (1 of 1 - PPSV23) 08/07/1986     PREVENTIVE CARE VISIT  08/17/2012     ZOSTER IMMUNIZATION (1 of 2) 08/07/2017     PAP  11/25/2018     A1C  09/06/2019     LIPID  09/28/2019     MAMMO SCREENING  12/28/2019     PHQ-2  01/01/2020     BMP  06/06/2020     INFLUENZA VACCINE (1) 09/01/2020     DTAP/TDAP/TD IMMUNIZATION (2 - Td) 08/17/2021     COLORECTAL CANCER SCREENING  02/27/2022     IPV IMMUNIZATION  Aged Out     MENINGITIS IMMUNIZATION  Aged Out       Patient Instructions   Patient Education     Plantar Fasciitis  Plantar fasciitis is a painful swelling of the plantar fascia. The plantar fascia is a thick, fibrous layer of tissue that covers the bones on the bottom of your foot. It supports the foot bones in an arched position.  Plantar fasciitis can happen gradually or suddenly. It usually affects one foot at a time. Heel pain can be sharp, like a knife sticking into the bottom of your foot. You may feel pain after exercising, long-distance jogging, stair climbing, long periods of standing, or after standing up.  Risk factors include: non-active lifestyle, arthritis, diabetes, obesity or recent weight gain, flat foot, high arch. Wearing high heels, loose shoes, or shoes with poor arch support for long periods of time adds to the risk. This problem is commonly found in runners and dancers. It also found in people who stand on hard surfaces for long periods of time.  Foot  pain from this condition is usually worse in the morning. But it often improves with walking. By the end of the day there may be a dull aching. Treatment requires short-term rest and controlling swelling. It may take up to 9 months before all symptoms go away. Rarely, a steroid injection into the foot, or surgery, may be needed.  Home care    If you are overweight, lose weight to help healing.    Choose supportive shoes with good arch support and shock absorbency. Replace athletic shoes when they become worn out. Don t walk or run barefoot.    Premade or custom-fitted shoe inserts may be helpful. Inserts made of silicone seem to be the most effective. Custom-made inserts can be provided by foot specialist, physical therapist, or orthopedist.    Premade or custom-made night splints keep the heel stretched out while you sleep. They may prevent morning pain.    Limit activities that stress the feet: jogging, prolonged standing or walking, contact sports, etc.    First thing in the morning and before sports, stretch the bottom of your feet. Gently flex your ankle so the toes move toward your knee.    Icing may help control heel pain. Apply an ice pack to the heel for 10 to 20 minutes as a preventive. Or ice your heel after a severe flare-up of symptoms. You may repeat this every 1 to 2 hours as needed.    You may use over-the-counter pain medicine to control pain, unless another medicine was prescribed. Anti-inflammatory pain medicines, such as ibuprofen or naproxen, may work better than acetaminophen. If you have chronic liver or kidney disease or ever had a stomach ulcer or gastrointestinal bleeding, talk with your healthcare provider before using these medicines.  Follow-up care  Follow up with your healthcare provider, or as advised.  Call for an appointment if pain worsens or there is no relief after a few weeks of home treatment. Shoe inserts, a night splint, or a special boot may be required.  If X-rays were  taken, you will be told of any new findings that may affect your care.  When to seek medical advice  Call your healthcare provider right away if any of these occur:    Foot swelling    Redness or warmth with increasing pain  Date Last Reviewed: 5/1/2018 2000-2019 [employer]. [address] [place] [address]. All rights reserved. This information is not intended as a substitute for professional medical care. Always follow your healthcare professional's instructions.         Patient Education     Toe Extension (Flexibility)    These instructions are for your right foot. Switch sides for your left foot.  1. Sit in a chair. Rest your right ankle on your left knee.  2. Hold your toes with your right hand. Gently bend the toes backward. Feel a stretch in the undersides of the toes and ball of the foot. Hold for 30 to 60 seconds.  3. Then gently bend the toes in the other direction. Gently press on them until your foot is pointed. Hold for 30 to 60 seconds.  4. Repeat 5 times, or as instructed.  Date Last Reviewed: 5/1/2016 2000-2019 [employer]. [address] [place], [place] [address]. All rights reserved. This information is not intended as a substitute for professional medical care. Always follow your healthcare professional's instructions.           For the swelling: compression stockings, limiting sodium, elevating legs at night    Diabetes: Please see the eye doctor, limit the sugars, and work on exercise, see the nutritionist          GYPSY Yeboah CNP  [facility]  Family Practice  [facility]  [phone]    For any issues my office # is [phone]

## 2020-07-03 LAB
ALBUMIN SERPL-MCNC: 4.3 G/DL (ref 3.8–4.9)
ALBUMIN/GLOB SERPL: 1.3 {RATIO} (ref 1.2–2.2)
ALP SERPL-CCNC: 113 IU/L (ref 39–117)
ALT SERPL-CCNC: 18 IU/L (ref 0–32)
AST SERPL-CCNC: 20 IU/L (ref 0–40)
BILIRUB SERPL-MCNC: 0.3 MG/DL (ref 0–1.2)
BUN SERPL-MCNC: 14 MG/DL (ref 6–24)
BUN/CREATININE RATIO: 15 (ref 9–23)
CALCIUM SERPL-MCNC: 9.4 MG/DL (ref 8.7–10.2)
CHLORIDE SERPLBLD-SCNC: 105 MMOL/L (ref 96–106)
CHOLEST SERPL-MCNC: 209 MG/DL (ref 100–199)
CREAT SERPL-MCNC: 0.94 MG/DL (ref 0.57–1)
EGFR IF AFRICN AM: 81 ML/MIN/1.73
EGFR IF NONAFRICN AM: 70 ML/MIN/1.73
GLOBULIN, TOTAL: 3.3 G/DL (ref 1.5–4.5)
GLUCOSE SERPL-MCNC: 115 MG/DL (ref 65–99)
HBA1C MFR BLD: 6.6 % (ref 4.8–5.6)
HDLC SERPL-MCNC: 49 MG/DL
LDL/HDL RATIO: 2.8 RATIO (ref 0–3.2)
LDLC SERPL CALC-MCNC: 139 MG/DL (ref 0–99)
POTASSIUM SERPL-SCNC: 4.7 MMOL/L (ref 3.5–5.2)
PROT SERPL-MCNC: 7.6 G/DL (ref 6–8.5)
SODIUM SERPL-SCNC: 143 MMOL/L (ref 134–144)
TOTAL CO2: 27 MMOL/L (ref 20–29)
TRIGL SERPL-MCNC: 104 MG/DL (ref 0–149)
VLDLC SERPL CALC-MCNC: 21 MG/DL (ref 5–40)

## 2020-07-07 ENCOUNTER — TELEPHONE (OUTPATIENT)
Dept: FAMILY MEDICINE | Facility: CLINIC | Age: 55
End: 2020-07-07

## 2020-07-07 NOTE — TELEPHONE ENCOUNTER
Called patient 07/06 with lab results.  A1C is stable.  Patient should still focus on diet, exercise and weight loss. Recommended is for patient to consider Metformin.  Patient at this time wants to work on her diet and exercise and hold off on the Metformin. Labs also show that patient has some degree of kidney damage, possibly from diabetes or hypertension. Advised patient to make sure she drinks  at least two liters of water daily and avoid ibuprofen/naproxen.  Advised patient we could consider increasing the dose of her lisinopril, which can better control her blood pressure and can protect her  kidneys. Lipid panel also shows high cholesterol-- elevated risk of heart disease/stroke for her  in the next ten years  Is (13.1%) . Recommended  is to start a medication such as atorvastatin or rosuvastatin.  Exercising 150 minutes per week, limiting saturated fat and simple sugars can help. Patient does feel that at this time she would like to hold off on any medications and work on diet, exercise and weight loss.  She will repeat labs in approx 6 months and see what numbers are at that time.  If still elevated will go on medications.

## 2020-08-28 ENCOUNTER — TRANSFERRED RECORDS (OUTPATIENT)
Dept: FAMILY MEDICINE | Facility: CLINIC | Age: 55
End: 2020-08-28

## 2020-08-28 LAB — RETINOPATHY: NORMAL

## 2020-12-14 ENCOUNTER — HEALTH MAINTENANCE LETTER (OUTPATIENT)
Age: 55
End: 2020-12-14

## 2020-12-30 ENCOUNTER — TRANSFERRED RECORDS (OUTPATIENT)
Dept: FAMILY MEDICINE | Facility: CLINIC | Age: 55
End: 2020-12-30

## 2021-04-17 ENCOUNTER — HEALTH MAINTENANCE LETTER (OUTPATIENT)
Age: 56
End: 2021-04-17

## 2021-08-07 ENCOUNTER — HEALTH MAINTENANCE LETTER (OUTPATIENT)
Age: 56
End: 2021-08-07

## 2021-10-02 ENCOUNTER — HEALTH MAINTENANCE LETTER (OUTPATIENT)
Age: 56
End: 2021-10-02

## 2021-10-22 ENCOUNTER — OFFICE VISIT (OUTPATIENT)
Dept: FAMILY MEDICINE | Facility: CLINIC | Age: 56
End: 2021-10-22

## 2021-10-22 VITALS
HEIGHT: 63 IN | WEIGHT: 226.4 LBS | DIASTOLIC BLOOD PRESSURE: 84 MMHG | HEART RATE: 91 BPM | OXYGEN SATURATION: 98 % | TEMPERATURE: 97.1 F | RESPIRATION RATE: 18 BRPM | SYSTOLIC BLOOD PRESSURE: 129 MMHG | BODY MASS INDEX: 40.11 KG/M2

## 2021-10-22 DIAGNOSIS — D50.0 IRON DEFICIENCY ANEMIA DUE TO CHRONIC BLOOD LOSS: ICD-10-CM

## 2021-10-22 DIAGNOSIS — I10 ESSENTIAL HYPERTENSION: Primary | ICD-10-CM

## 2021-10-22 DIAGNOSIS — E11.9 TYPE 2 DIABETES MELLITUS WITHOUT COMPLICATION, WITHOUT LONG-TERM CURRENT USE OF INSULIN (H): ICD-10-CM

## 2021-10-22 DIAGNOSIS — Z71.89 ADVANCED CARE PLANNING/COUNSELING DISCUSSION: ICD-10-CM

## 2021-10-22 DIAGNOSIS — E66.01 MORBID OBESITY (H): ICD-10-CM

## 2021-10-22 LAB
% GRANULOCYTES: 42.5 % (ref 42.2–75.2)
A/C RATIO MG/G: NORMAL MG/G
ALBUMIN (URINE) MG/L: 80 MG/L
HBA1C MFR BLD: 6.5 % (ref 4–6)
HCT VFR BLD AUTO: 35.4 % (ref 35–46)
HEMOGLOBIN: 12.1 G/DL (ref 11.8–15.5)
INTERPRETATION: NORMAL
LYMPHOCYTES NFR BLD AUTO: 47.9 % (ref 20.5–51.1)
MCH RBC QN AUTO: 28.2 PG (ref 27–31)
MCHC RBC AUTO-ENTMCNC: 34.3 G/DL (ref 33–37)
MCV RBC AUTO: 82.4 FL (ref 80–100)
MONOCYTES NFR BLD AUTO: 9.6 % (ref 1.7–9.3)
PLATELET # BLD AUTO: 258 K/UL (ref 140–450)
RBC # BLD AUTO: 4.3 X10/CMM (ref 3.7–5.2)
URINE CREATININE MG/DL - QUEST: 300 MG/DL
WBC # BLD AUTO: 6.1 X10/CMM (ref 3.8–11)

## 2021-10-22 PROCEDURE — 99214 OFFICE O/P EST MOD 30 MIN: CPT | Performed by: NURSE PRACTITIONER

## 2021-10-22 PROCEDURE — 36415 COLL VENOUS BLD VENIPUNCTURE: CPT | Performed by: NURSE PRACTITIONER

## 2021-10-22 PROCEDURE — 83036 HEMOGLOBIN GLYCOSYLATED A1C: CPT | Performed by: NURSE PRACTITIONER

## 2021-10-22 PROCEDURE — 93050 ART PRESSURE WAVEFORM ANALYS: CPT | Performed by: NURSE PRACTITIONER

## 2021-10-22 PROCEDURE — 82044 UR ALBUMIN SEMIQUANTITATIVE: CPT | Performed by: NURSE PRACTITIONER

## 2021-10-22 PROCEDURE — 85025 COMPLETE CBC W/AUTO DIFF WBC: CPT | Performed by: NURSE PRACTITIONER

## 2021-10-22 PROCEDURE — 99207 PR FOOT EXAM NO CHARGE: CPT | Performed by: NURSE PRACTITIONER

## 2021-10-22 RX ORDER — LOSARTAN POTASSIUM AND HYDROCHLOROTHIAZIDE 12.5; 5 MG/1; MG/1
1 TABLET ORAL DAILY
Qty: 90 TABLET | Refills: 0 | Status: SHIPPED | OUTPATIENT
Start: 2021-10-22 | End: 2022-01-25

## 2021-10-22 ASSESSMENT — MIFFLIN-ST. JEOR: SCORE: 1600.03

## 2021-10-22 NOTE — PATIENT INSTRUCTIONS
Come back 2 weeks after your second Moderna shot. Then we can recheck your blood pressure and give you tetanus booster

## 2021-10-22 NOTE — PROGRESS NOTES
"Problem(s) Oriented visit        SUBJECTIVE:                                                    Cheyenne Beckman is a 56 year old female who presents to clinic today for the following health issues :    Diabetes - type 2, well controlled without medication. Has lost 10 lbs in the past year. Trying to walk more. Has been eating healthier. Had eye exam last year showing no diabetic retinopathy. Declines aspirin, statin. Does not check blood sugars    Hypertension - controlled with Lisinopril-hydrochlorothiazide 10-12.5 mg daily but thinks that this is causing her ankles to swell and has dry cough. Denies headaches, chest pain, pressure, palpitations, shortness of breath.  BP Readings from Last 3 Encounters:   10/22/21 129/84   07/02/20 134/88   06/09/20 134/76     Obesity - working on losing weight with diet & exercise  Wt Readings from Last 5 Encounters:   10/22/21 102.7 kg (226 lb 6.4 oz)   07/02/20 107 kg (236 lb)   06/09/20 106.6 kg (235 lb)   06/06/19 104.3 kg (230 lb)   09/28/18 103.1 kg (227 lb 3.2 oz)     Iron deficiency anemia - not taking iron supplements    Problem list, Medication list, Allergies, and Medical/Social/Surgical histories reviewed in [facility] and updated as appropriate.   Additional history: as documented    ROS:  5 point ROS completed and negative except noted above, including Gen, CV, Resp, MS, Neuro    OBJECTIVE:                                                    /84   Pulse 91   Temp 97.1  F (36.2  C) (Temporal)   Resp 18   Ht 1.607 m (5' 3.25\")   Wt 102.7 kg (226 lb 6.4 oz)   SpO2 98%   BMI 39.79 kg/m    Body mass index is 39.79 kg/m .   GENERAL: healthy, alert and no distress  EYES: Eyes grossly normal to inspection  RESP: lungs clear to auscultation - no rales, rhonchi or wheezes  CV: regular rates and rhythm, normal S1 S2, no S3 or S4, no murmur, click or rub, peripheral pulses strong and 1+ bilateral lower extremity pitting edema to ankles    MS: extremities normal- no " "gross deformities noted  SKIN: no suspicious lesions or rashes  NEURO: Normal strength and tone, sensory exam grossly normal and mentation intact  PSYCH: affect normal/bright     ASSESSMENT/PLAN:                                                        BMI:   Estimated body mass index is 39.79 kg/m  as calculated from the following:    Height as of this encounter: 1.607 m (5' 3.25\").    Weight as of this encounter: 102.7 kg (226 lb 6.4 oz).   Weight management plan: Discussed healthy diet and exercise guidelines      Cheyenne was seen today for diabetes.    Diagnoses and all orders for this visit:    Essential hypertension  -     AR ART PRESS WAVEFORM ANALYS CENTRAL ART PRESSURE  -     losartan-hydrochlorothiazide (HYZAAR) 50-12.5 MG tablet; Take 1 tablet by mouth daily  Well controlled but due to side effects will try alternate medication Losartan-hydrochlorothiazide 50-12.5 mg daily. Return in around 6 weeks for recheck, sooner if having concerns.    Type 2 diabetes mellitus without complication, without long-term current use of insulin (H)  -     Hemoglobin A1C ([facility])  -     Microalbumin ([facility])  -     AR FOOT EXAM NO CHARGE  -     VENOUS COLLECTION  -     Lipid Panel ([facility])  -     Comp. Metabolic Panel (14) ([facility])  Well controlled without medication. Patient prefers to manage with diet & exercise. No need to check blood sugars. Recommend getting A1C checked every 6 months.    Morbid obesity (H)  Diet & exercise discussed    Iron deficiency anemia due to chronic blood loss  -     CBC with Diff/Plt ([facility])  Lab ordered    Advanced care planning/counseling discussion      Plans to get Moderna Covid vaccine (first dose) tomorrow at pharmacy. Prefers to hold off on other vaccinations at this time.    See Patient Instructions  Patient Instructions   Come back 2 weeks after your second Moderna shot. Then we can recheck your blood pressure and give you tetanus booster      GYPSY [facility] " [facility]  Family Practice  [facility]  [phone]    For any issues my office # is [phone]

## 2021-10-23 LAB
ALBUMIN SERPL-MCNC: 4.4 G/DL (ref 3.8–4.9)
ALBUMIN/GLOB SERPL: 1.3 {RATIO} (ref 1.2–2.2)
ALP SERPL-CCNC: 140 IU/L (ref 44–121)
ALT SERPL-CCNC: 25 IU/L (ref 0–32)
AST SERPL-CCNC: 26 IU/L (ref 0–40)
BILIRUB SERPL-MCNC: 0.3 MG/DL (ref 0–1.2)
BUN SERPL-MCNC: 12 MG/DL (ref 6–24)
BUN/CREATININE RATIO: 14 (ref 9–23)
CALCIUM SERPL-MCNC: 9.9 MG/DL (ref 8.7–10.2)
CHLORIDE SERPLBLD-SCNC: 101 MMOL/L (ref 96–106)
CHOLEST SERPL-MCNC: 183 MG/DL (ref 100–199)
CREAT SERPL-MCNC: 0.83 MG/DL (ref 0.57–1)
EGFR IF AFRICN AM: 92 ML/MIN/1.73
EGFR IF NONAFRICN AM: 80 ML/MIN/1.73
GLOBULIN, TOTAL: 3.5 G/DL (ref 1.5–4.5)
GLUCOSE SERPL-MCNC: 120 MG/DL (ref 65–99)
HDLC SERPL-MCNC: 51 MG/DL
LDL/HDL RATIO: 2.2 RATIO (ref 0–3.2)
LDLC SERPL CALC-MCNC: 113 MG/DL (ref 0–99)
POTASSIUM SERPL-SCNC: 4.1 MMOL/L (ref 3.5–5.2)
PROT SERPL-MCNC: 7.9 G/DL (ref 6–8.5)
SODIUM SERPL-SCNC: 142 MMOL/L (ref 134–144)
TOTAL CO2: 28 MMOL/L (ref 20–29)
TRIGL SERPL-MCNC: 107 MG/DL (ref 0–149)
VLDLC SERPL CALC-MCNC: 19 MG/DL (ref 5–40)

## 2021-12-30 LAB
HPV ABSTRACT: NORMAL
PAP-ABSTRACT: NORMAL

## 2022-01-24 DIAGNOSIS — I10 ESSENTIAL HYPERTENSION: ICD-10-CM

## 2022-01-24 NOTE — TELEPHONE ENCOUNTER
LOSARTAN-HCTZ     LOV: 10/22/21  LAST LABS: 10/22/21    BP Readings from Last 3 Encounters:   10/22/21 129/84   07/02/20 134/88   06/09/20 134/76

## 2022-01-25 RX ORDER — LOSARTAN POTASSIUM AND HYDROCHLOROTHIAZIDE 12.5; 5 MG/1; MG/1
1 TABLET ORAL DAILY
Qty: 90 TABLET | Refills: 0 | Status: SHIPPED | OUTPATIENT
Start: 2022-01-25 | End: 2022-04-26

## 2022-03-19 ENCOUNTER — HEALTH MAINTENANCE LETTER (OUTPATIENT)
Age: 57
End: 2022-03-19

## 2022-04-26 DIAGNOSIS — I10 ESSENTIAL HYPERTENSION: ICD-10-CM

## 2022-04-26 RX ORDER — LOSARTAN POTASSIUM AND HYDROCHLOROTHIAZIDE 12.5; 5 MG/1; MG/1
TABLET ORAL
Qty: 90 TABLET | Refills: 0 | Status: SHIPPED | OUTPATIENT
Start: 2022-04-26 | End: 2022-07-25

## 2022-04-26 NOTE — TELEPHONE ENCOUNTER
Losartan- hydrochlorothiazide  LOV 10/22/21  Return in 6 months - due   BP Readings from Last 3 Encounters:   10/22/21 129/84   07/02/20 134/88   06/09/20 134/76     Component      Latest Ref Rng & Units 10/22/2021   Glucose      65 - 99 mg/dL 120 (H)   Urea Nitrogen      6 - 24 mg/dL 12   Creatinine      0.57 - 1.00 mg/dL 0.83   eGFR If NonAfricn Am      >59 mL/min/1.73 80   eGFR If Africn Am      >59 mL/min/1.73 92   BUN/Creatinine Ratio      9 - 23 14   Sodium      134 - 144 mmol/L 142   Potassium      3.5 - 5.2 mmol/L 4.1   Chloride      96 - 106 mmol/L 101   Total CO2      20 - 29 mmol/L 28   Calcium      8.7 - 10.2 mg/dL 9.9   Protein Total      6.0 - 8.5 g/dL 7.9   Albumin      3.8 - 4.9 g/dL 4.4   Globulin, Total      1.5 - 4.5 g/dL 3.5   A/G Ratio      1.2 - 2.2 1.3   Bilirubin Total      0.0 - 1.2 mg/dL 0.3   Alkaline Phosphatase      44 - 121 IU/L 140 (H)   AST      0 - 40 IU/L 26   ALT      0 - 32 IU/L 25   Cholesterol      100 - 199 mg/dL 183   Triglycerides      0 - 149 mg/dL 107   HDL Cholesterol      >39 mg/dL 51   VLDL Cholesterol Favian      5 - 40 mg/dL 19   LDL Cholesterol Calculated      0 - 99 mg/dL 113 (H)   LDL/HDL Ratio      0.0 - 3.2 ratio 2.2

## 2022-04-28 NOTE — TELEPHONE ENCOUNTER
LMTCB--Patient is due for 6 month diabetic check and labs and is over due from last appt.  Paula wanted to f/unit(s) after moderna shot and check BP    Joel Maynard,   [facility]  [phone]

## 2022-07-09 ENCOUNTER — HEALTH MAINTENANCE LETTER (OUTPATIENT)
Age: 57
End: 2022-07-09

## 2022-07-25 DIAGNOSIS — I10 ESSENTIAL HYPERTENSION: ICD-10-CM

## 2022-07-25 RX ORDER — LOSARTAN POTASSIUM AND HYDROCHLOROTHIAZIDE 12.5; 5 MG/1; MG/1
TABLET ORAL
Qty: 90 TABLET | Refills: 0 | Status: SHIPPED | OUTPATIENT
Start: 2022-07-25 | End: 2022-10-28

## 2022-09-03 ENCOUNTER — HEALTH MAINTENANCE LETTER (OUTPATIENT)
Age: 57
End: 2022-09-03

## 2022-09-19 ENCOUNTER — OFFICE VISIT (OUTPATIENT)
Dept: FAMILY MEDICINE | Facility: CLINIC | Age: 57
End: 2022-09-19

## 2022-09-19 ENCOUNTER — HOSPITAL ENCOUNTER (OUTPATIENT)
Dept: ULTRASOUND IMAGING | Facility: CLINIC | Age: 57
Discharge: HOME | End: 2022-09-19
Attending: NURSE PRACTITIONER | Admitting: NURSE PRACTITIONER
Payer: COMMERCIAL

## 2022-09-19 VITALS
SYSTOLIC BLOOD PRESSURE: 112 MMHG | BODY MASS INDEX: 39.57 KG/M2 | DIASTOLIC BLOOD PRESSURE: 72 MMHG | HEART RATE: 82 BPM | OXYGEN SATURATION: 96 % | WEIGHT: 231.8 LBS | HEIGHT: 64 IN

## 2022-09-19 DIAGNOSIS — E66.813 CLASS 3 SEVERE OBESITY DUE TO EXCESS CALORIES WITH SERIOUS COMORBIDITY AND BODY MASS INDEX (BMI) OF 40.0 TO 44.9 IN ADULT (H): ICD-10-CM

## 2022-09-19 DIAGNOSIS — E78.00 ELEVATED LDL CHOLESTEROL LEVEL: ICD-10-CM

## 2022-09-19 DIAGNOSIS — Z80.0 FAMILY HISTORY OF COLON CANCER: ICD-10-CM

## 2022-09-19 DIAGNOSIS — E11.29 TYPE 2 DIABETES MELLITUS WITH MICROALBUMINURIA, WITHOUT LONG-TERM CURRENT USE OF INSULIN (H): ICD-10-CM

## 2022-09-19 DIAGNOSIS — M79.669 CALF PAIN: ICD-10-CM

## 2022-09-19 DIAGNOSIS — I10 BENIGN ESSENTIAL HYPERTENSION: ICD-10-CM

## 2022-09-19 DIAGNOSIS — R80.9 TYPE 2 DIABETES MELLITUS WITH MICROALBUMINURIA, WITHOUT LONG-TERM CURRENT USE OF INSULIN (H): ICD-10-CM

## 2022-09-19 DIAGNOSIS — Z00.00 ROUTINE GENERAL MEDICAL EXAMINATION AT A HEALTH CARE FACILITY: Primary | ICD-10-CM

## 2022-09-19 DIAGNOSIS — Z86.2 HISTORY OF ANEMIA: ICD-10-CM

## 2022-09-19 DIAGNOSIS — Z12.11 SCREEN FOR COLON CANCER: ICD-10-CM

## 2022-09-19 DIAGNOSIS — E66.01 CLASS 3 SEVERE OBESITY DUE TO EXCESS CALORIES WITH SERIOUS COMORBIDITY AND BODY MASS INDEX (BMI) OF 40.0 TO 44.9 IN ADULT (H): ICD-10-CM

## 2022-09-19 DIAGNOSIS — M79.662 PAIN OF LEFT CALF: ICD-10-CM

## 2022-09-19 LAB
% GRANULOCYTES: 51.5 % (ref 42.2–75.2)
CHOL/HDL RATIO (RMG): 3.9 MG/DL (ref 0–4.5)
CHOLESTEROL: 179 MG/DL (ref 100–199)
HBA1C MFR BLD: 6.9 % (ref 4–6)
HCT VFR BLD AUTO: 34.6 % (ref 35–46)
HDL (RMG): 46 MG/DL (ref 40–?)
HEMOGLOBIN: 11.7 G/DL (ref 11.8–15.5)
LDL CALCULATED (RMG): 104 MG/DL (ref 0–130)
LYMPHOCYTES NFR BLD AUTO: 41 % (ref 20.5–51.1)
MCH RBC QN AUTO: 28.1 PG (ref 27–31)
MCHC RBC AUTO-ENTMCNC: 33.7 G/DL (ref 33–37)
MCV RBC AUTO: 83.3 FL (ref 80–100)
MONOCYTES NFR BLD AUTO: 7.5 % (ref 1.7–9.3)
PLATELET # BLD AUTO: 248 K/UL (ref 140–450)
RBC # BLD AUTO: 4.15 X10/CMM (ref 3.7–5.2)
TRIGLYCERIDES (RMG): 147 MG/DL (ref 0–149)
WBC # BLD AUTO: 5.9 X10/CMM (ref 3.8–11)

## 2022-09-19 PROCEDURE — 82044 UR ALBUMIN SEMIQUANTITATIVE: CPT | Performed by: NURSE PRACTITIONER

## 2022-09-19 PROCEDURE — 36415 COLL VENOUS BLD VENIPUNCTURE: CPT | Performed by: NURSE PRACTITIONER

## 2022-09-19 PROCEDURE — 99213 OFFICE O/P EST LOW 20 MIN: CPT | Mod: 25 | Performed by: NURSE PRACTITIONER

## 2022-09-19 PROCEDURE — 80061 LIPID PANEL: CPT | Mod: QW | Performed by: NURSE PRACTITIONER

## 2022-09-19 PROCEDURE — 93971 EXTREMITY STUDY: CPT | Mod: LT

## 2022-09-19 PROCEDURE — 83036 HEMOGLOBIN GLYCOSYLATED A1C: CPT | Performed by: NURSE PRACTITIONER

## 2022-09-19 PROCEDURE — 99396 PREV VISIT EST AGE 40-64: CPT | Performed by: NURSE PRACTITIONER

## 2022-09-19 PROCEDURE — 85025 COMPLETE CBC W/AUTO DIFF WBC: CPT | Performed by: NURSE PRACTITIONER

## 2022-09-19 NOTE — PROGRESS NOTES
Female Preventive Health Visit    SUBJECTIVE:    Cheyenne Beckman is a 57 year old female presenting for a routine preventive physical exam.  The patient has the following concerns:   1. DM follow up: Diagnosed with T2DM, initially had hgb A1C 10.1 in 2015, but has been able to control DM with diet and exercise. She has never taken medication for this. Not on statin or aspirin and declines these medications. She is due for annual eye exam and microalbumin. No polydipsia/polyuria, unintended weight loss, neuropathy.       2. HTN: Not checking BPs outside the clinic. No problems with medications, taking regularly- losartan-hydrochlorothiazide. No SOB, chest pain or pressure, orthopnea, PND, neurologic changes, claudication, or edema.    3. L calf pain for the past five months, gradual onset. Hurts with certain position changes and when walking/flexing her calf in certain positions. No numbness or tingling, erythema, edema, or warmth. Stable over time.    4. Obesity: Working on exercising regularly. States her diet has been poor the past few months, eating more sugar than normal. Declines medical weight management at this time.        Up to date on mammogram, breast exam, and Pap through Dr. Álvaro HER signed    OB/Gyn History:      LMP over a year ago, no post menopausal bleeding  Gyn Surgery:  Tubal ligation.  Current Contraceptive Method:  menopause. not currently sexually active.  Urinary incontinence/post-menopausal bleeding: no    Patient's medications, allergies, past medical, surgical, social and family histories were reviewed and updated as appropriate.    Past Medical History:   Diagnosis Date     Hypertension      Panic attack      Past Surgical History:   Procedure Laterality Date     TUBAL LIGATION       Family History   Problem Relation Age of Onset     Hypertension Maternal Grandmother      Cancer Maternal Grandmother      Depression Sister      Diabetes Maternal Uncle      Hypertension Mother   "     No Known Allergies  Social History     Socioeconomic History     Marital status:      Number of children: 3   Occupational History     Employer: [employer]   Tobacco Use     Smoking status: Never Smoker     Smokeless tobacco: Never Used   Substance and Sexual Activity     Alcohol use: Yes     Alcohol/week: 0.0 - 1.0 standard drinks     Comment: social - rarely     Drug use: No     Sexual activity: Not Currently          Health Maintenance:  Health maintenance alerts were reviewed and updated as appropriate.  Diet and Exercise: Walking three times a week after work for 30-60 minutes. Trying to get more vegetables.   Breast cancer screenin2021 mammogram through [facility]  Cervical cancer screenin2021 through [facility]  Osteoporosis screen/DEXA: not yet indicated   Lung cancer screening: no smoking hx   Colon cancer screenin2017, fam hx polyps, needs repeat this year  Immunizations: Due for zoster, influenza, PCV20      Healthy Habits:    Do you get at least three servings of calcium containing foods daily (dairy, green leafy vegetables, etc.)? No    Amount of exercise or daily activities, outside of work: 3 per week    Problems taking medications regularly no    Medication side effects: no    Have you had an eye exam in the past two years? yes    Do you see a dentist twice per year? yes    Do you have sleep apnea, excessive snoring or daytime drowsiness? no      Abuse: Current or Past(Physical, Sexual or Emotional)- No  Do you feel safe in your environment? Yes    Have you ever done Advance Care Planning? (For example, a Health Directive, POLST, or a discussion with a medical provider or your loved ones about your wishes): No, advance care planning information given to patient to review.  Patient plans to discuss their wishes with loved ones or provider.      OBJECTIVE:  Physical Exam:    /72   Pulse 82   Ht 1.613 m (5' 3.5\")   Wt 105.1 kg (231 lb 12.8 oz)   LMP 09/15/2021   SpO2 " 96%   BMI 40.42 kg/m      CONSTITUTIONAL: Alert non-toxic appearing female in no acute distress  HEAD: Normocephalic, atraumatic  EYES: PERRLA; no scleral icterus; sclera without injection  ENT: EACs clear bilaterally, TMs pearly gray and intact with good visualization of bony landmarks and cone of light, no bulging or erythema; nares patent without ulceration or edema; oropharynx pink without lesions; posterior pharynx without exudates  NECK: Neck supple without lymphadenopathy, thyroid without enlargement or nodularity  BREAST: done per gyn  RESPIRATORY: Lungs clear to auscultation, respirations unlabored  CV: Regular rate and rhythm, S1S2, no clicks, murmurs, rubs, or gallops; bilateral lower extremities without edema, posterior tibialis pulses 2+ bilaterally  GASTROINTESTINAL: Normoactive bowel sounds x4 quadrants, abdomen soft, non-distended, and non-tender to palpation without masses or organomegaly  MUSCULOSKELETAL: Moves all extremities, no obvious muscle wasting; L calf with tenderness to the superior aspect of the gastrocnemius, no pain with dorsiflexion or plantarflexion. Pain with flexion of the knee in the gastrocnemius. No erythema or edema, warmth, or palpable cords.  NEUROLOGIC: No gross deficits, normal gait  SKIN: Appropriate color for race, warm and dry, no suspicious lesions or rashes  PSYCHIATRIC: Pleasant and interactive, affect euthymic, makes appropriate eye contact, thought process logical            ASSESSMENT / PLAN:  This 57 year old female presents for a routine preventive physical exam. Preventive health topics discussed including adequate exercise and healthy diet. Return to clinic in one year for preventative exam or sooner with any other concerns.  Other issues discussed as noted below.    Cheyenne was seen today for physical and diabetes.    Diagnoses and all orders for this visit:    Routine general medical examination at a health care facility    Well appearing 57 year old female.  See below for counseling. Up to date on Pap and mammogram, ARDEN signed. Due for colonoscopy.    Type 2 diabetes mellitus with microalbuminuria, without long-term current use of insulin (H)  -     Hemoglobin A1C ([facility])  -     Eye Exam Referral; Future  -     VENOUS COLLECTION  -     Comp. Metabolic Panel (14) ([facility])  -     Microalbumin ([facility])    A1C at goal, but worsening over time and with positive microalbuminuria. On ARB. Declines pharmacotherapy for diabetes and obesity at this time. Declines aspirin or statin- however, given anemia today and hx of gastritis with h pylori, would hold off on aspirin regardless. Discussed vaccines- declines today. Recheck in six months, earlier with concerns. Continue working on healthy diet and exercise.    Class 3 severe obesity due to excess calories with serious comorbidity and body mass index (BMI) of 40.0 to 44.9 in adult (H)    BMI 40.42 with associated conditions of T2DM, HTN, and dyslipidemia. Treatment of morbid obesity would likely improve outcomes in associated conditions previously mentioned. Recommend monitoring diet, 150 minutes of cardiovascular exercise per week. Consider medical weight management if morbid obesity fails to improve with lifestyle changes- declines at this time.    Benign essential hypertension    Well controlled, continue current regimen with losartan-hydrochlorothiazide.    Family history of colon cancer  Screen for colon cancer  -     Adult GI  Referral - Procedure Only; Future    Elevated LDL cholesterol level  -     Lipid Profile ([facility])    Declines statin, reviewed rationale for prevention. Work on healthy diet, exercise, weight loss.    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 If negative for DVT, refer to PT    History of anemia  -     CBC with  Diff/Plt ([facility])  -     Ferritin  Serum ([facility])  -     Iron and TIBC ([facility])    Likely secondary to previous h pylori gastritis. Mildly low today, recheck iron studies.        Patient has been advised of split billing requirements and indicates understanding: Yes  COUNSELING:   Reviewed preventive health counseling, as reflected in patient instructions    Body mass index is 40.42 kg/m .      Weight management plan: Discussed healthy diet and exercise guidelines      Counseling Resources:  ATP IV Guidelines  Pooled Cohorts Equation Calculator  Breast Cancer Risk Calculator  BRCA-Related Cancer Risk Assessment: FHS-7 Tool  FRAX Risk Assessment  ICSI Preventive Guidelines  Dietary Guidelines for Americans, 2010  USDA's MyPlate  ASA Prophylaxis  Lung CA Screening    GYPSY Yeboah CNP  [facility]

## 2022-09-19 NOTE — PATIENT INSTRUCTIONS
Preventive Health Recommendations  Female Ages 50 - 64    Yearly exam: See your health care provider every year in order to  Review health changes.   Discuss preventive care.    Review your medicines if your doctor has prescribed any.    Get a Pap test every three years (unless you have an abnormal result and your provider advises testing more often).  If you get Pap tests with HPV test, you only need to test every 5 years, unless you have an abnormal result.   You do not need a Pap test if your uterus was removed (hysterectomy) and you have not had cancer.  You should be tested each year for STDs (sexually transmitted diseases) if you're at risk.   Have a mammogram every 1 to 2 years.  Have a colonoscopy at age 50, or have a yearly FIT test (stool test). These exams screen for colon cancer.    Have a cholesterol test every 5 years, or more often if advised.  Have a diabetes test (fasting glucose) every three years. If you are at risk for diabetes, you should have this test more often.   If you are at risk for osteoporosis (brittle bone disease), think about having a bone density scan (DEXA).    Shots: Get a flu shot each year. Get a tetanus shot every 10 years.    Nutrition:   Eat at least 5 servings of fruits and vegetables each day.  Eat whole-grain bread, whole-wheat pasta and brown rice instead of white grains and rice.  Get adequate Calcium and Vitamin D.     Lifestyle  Exercise at least 150 minutes a week (30 minutes a day, 5 days a week). This will help you control your weight and prevent disease.  Limit alcohol to one drink per day.  No smoking.   Wear sunscreen to prevent skin cancer.   See your dentist every six months for an exam and cleaning.  See your eye doctor every 1 to 2 years.    A couple of options for diabetes and weight management- GLP1 agonist such as Ozempic/Trulicity- injectable agent, metformin- pill  Consider setting up an appointment with Dr. Holguin for medical weight  management    Ultrasound of the left calf to rule out a blood clot- if this is negative, will order PT    Let me know if you change your mind about a statin for diabetes/prevention purposes with cholesterol    Colonoscopy ordered    Vaccinations to consider: pneumococcal, zoster (shingles), influenza, COVID booster    Recheck in six months, earlier with concerns

## 2022-09-20 LAB
ALBUMIN SERPL-MCNC: 4.3 G/DL (ref 3.8–4.9)
ALBUMIN/GLOB SERPL: 1.5 {RATIO} (ref 1.2–2.2)
ALP SERPL-CCNC: 134 IU/L (ref 44–121)
ALT SERPL-CCNC: 18 IU/L (ref 0–32)
AST SERPL-CCNC: 22 IU/L (ref 0–40)
BILIRUB SERPL-MCNC: 0.3 MG/DL (ref 0–1.2)
BUN SERPL-MCNC: 11 MG/DL (ref 6–24)
BUN/CREATININE RATIO: 13 (ref 9–23)
CALCIUM SERPL-MCNC: 9.1 MG/DL (ref 8.7–10.2)
CHLORIDE SERPLBLD-SCNC: 102 MMOL/L (ref 96–106)
CREAT SERPL-MCNC: 0.83 MG/DL (ref 0.57–1)
EGFR: 83 ML/MIN/1.73
FERRITIN SERPL-MCNC: 295 NG/ML (ref 15–150)
GLOBULIN, TOTAL: 2.9 G/DL (ref 1.5–4.5)
GLUCOSE SERPL-MCNC: 124 MG/DL (ref 65–99)
IRON BINDING CAP: 335 UG/DL (ref 250–450)
IRON SATURATION: 18 % (ref 15–55)
IRON: 60 UG/DL (ref 27–159)
POTASSIUM SERPL-SCNC: 4.1 MMOL/L (ref 3.5–5.2)
PROT SERPL-MCNC: 7.2 G/DL (ref 6–8.5)
SODIUM SERPL-SCNC: 142 MMOL/L (ref 134–144)
TOTAL CO2: 24 MMOL/L (ref 20–29)
UIBC: 275 UG/DL (ref 131–425)

## 2022-09-21 LAB
A/C RATIO (RMG): <30
ALBUMIN- RMG: 30 MG/L (ref 0–10)
INTERPRETATION: NORMAL
URINE CREATININE - RMG: 300 MG/DL (ref 0–300)

## 2022-09-22 ENCOUNTER — TELEPHONE (OUTPATIENT)
Dept: FAMILY MEDICINE | Facility: CLINIC | Age: 57
End: 2022-09-22

## 2022-09-22 DIAGNOSIS — Z86.2 HISTORY OF ANEMIA: Primary | ICD-10-CM

## 2022-09-22 DIAGNOSIS — D64.9 LOW HEMOGLOBIN: ICD-10-CM

## 2022-09-22 NOTE — TELEPHONE ENCOUNTER
Called and spoke with patient who reports that she has not been taking omeprazole, per Carolina H.pylori testing ordered. Patient does wish to check b12 and folate, future lab order entered for this. She is scheduled for lab only 9/23. Mary Kate Shelby

## 2022-09-22 NOTE — TELEPHONE ENCOUNTER
----- Message from GYPSY Yeboah CNP sent at 9/21/2022 12:04 PM CDT -----  Dear Cheyenne, the iron was on the lower end of normal. It will be good to get the colonoscopy. Are you still taking the omeprazole? If you're not taking this, then I would like to get an H pylori test. If you are taking this, we could have an endoscopy and the same time as the colonoscopy to make sure you're not losing any blood from the upper GI tract and/or colon. The ferritin is a little high- this can be elevated in infection, inflammation which is why I'm curious about the H pylori. We could check B12 and folate as well as a cause of the lower hemoglobin, but I think we would need to have you back for a lab appointment (I already added on extra things with the iron testing and they likely would need more blood...). Alkaline phosphatase is elevated slightly- this can be a marker of the gallbladder vs bone turnover. If you're returning for labs, we could add on a few other labs to check and see where this rise is coming from which can determine the next steps.  Let me know your thoughts.  Take care,  [place]

## 2022-09-23 DIAGNOSIS — D64.9 LOW HEMOGLOBIN: ICD-10-CM

## 2022-09-23 DIAGNOSIS — Z86.2 HISTORY OF ANEMIA: ICD-10-CM

## 2022-09-23 PROCEDURE — 36415 COLL VENOUS BLD VENIPUNCTURE: CPT | Performed by: NURSE PRACTITIONER

## 2022-09-24 LAB
FOLATE: 10.5 NG/ML
VIT B12 SERPL-MCNC: 767 PG/ML (ref 232–1245)

## 2022-09-26 DIAGNOSIS — Z86.2 HISTORY OF ANEMIA: ICD-10-CM

## 2022-09-26 DIAGNOSIS — D64.9 LOW HEMOGLOBIN: ICD-10-CM

## 2022-09-29 LAB — H PYLORI AG STL QL: NEGATIVE

## 2022-10-28 DIAGNOSIS — I10 ESSENTIAL HYPERTENSION: ICD-10-CM

## 2022-10-28 NOTE — TELEPHONE ENCOUNTER
Losartan/hctz. Last addressed 9/19/22.   Benign essential hypertension     Well controlled, continue current regimen with losartan-hydrochlorothiazide.  BP Readings from Last 3 Encounters:   09/19/22 112/72   10/22/21 129/84   07/02/20 134/88

## 2022-11-01 RX ORDER — LOSARTAN POTASSIUM AND HYDROCHLOROTHIAZIDE 12.5; 5 MG/1; MG/1
1 TABLET ORAL DAILY
Qty: 90 TABLET | Refills: 1 | Status: SHIPPED | OUTPATIENT
Start: 2022-11-01 | End: 2023-04-21

## 2022-12-02 ENCOUNTER — TRANSFERRED RECORDS (OUTPATIENT)
Dept: FAMILY MEDICINE | Facility: CLINIC | Age: 57
End: 2022-12-02

## 2022-12-02 LAB — RETINOPATHY: NEGATIVE

## 2023-01-14 ENCOUNTER — HEALTH MAINTENANCE LETTER (OUTPATIENT)
Age: 58
End: 2023-01-14

## 2023-03-18 ENCOUNTER — HOSPITAL ENCOUNTER (EMERGENCY)
Facility: CLINIC | Age: 58
Discharge: HOME | End: 2023-03-18
Attending: EMERGENCY MEDICINE | Admitting: EMERGENCY MEDICINE
Payer: COMMERCIAL

## 2023-03-18 ENCOUNTER — APPOINTMENT (OUTPATIENT)
Dept: GENERAL RADIOLOGY | Facility: CLINIC | Age: 58
End: 2023-03-18
Attending: EMERGENCY MEDICINE
Payer: COMMERCIAL

## 2023-03-18 VITALS
HEIGHT: 63 IN | BODY MASS INDEX: 41.06 KG/M2 | SYSTOLIC BLOOD PRESSURE: 146 MMHG | TEMPERATURE: 97 F | DIASTOLIC BLOOD PRESSURE: 83 MMHG | RESPIRATION RATE: 16 BRPM | OXYGEN SATURATION: 99 % | HEART RATE: 103 BPM

## 2023-03-18 DIAGNOSIS — M25.562 LEFT KNEE PAIN, UNSPECIFIED CHRONICITY: ICD-10-CM

## 2023-03-18 PROCEDURE — 73562 X-RAY EXAM OF KNEE 3: CPT | Mod: LT

## 2023-03-18 PROCEDURE — 250N000013 HC RX MED GY IP 250 OP 250 PS 637: Performed by: EMERGENCY MEDICINE

## 2023-03-18 PROCEDURE — 99283 EMERGENCY DEPT VISIT LOW MDM: CPT

## 2023-03-18 RX ORDER — IBUPROFEN 600 MG/1
600 TABLET, FILM COATED ORAL ONCE
Status: COMPLETED | OUTPATIENT
Start: 2023-03-18 | End: 2023-03-18

## 2023-03-18 RX ADMIN — IBUPROFEN 600 MG: 600 TABLET ORAL at 11:36

## 2023-03-18 ASSESSMENT — ENCOUNTER SYMPTOMS
JOINT SWELLING: 1
FEVER: 0
CHILLS: 0
MYALGIAS: 1

## 2023-03-18 NOTE — ED NOTES
"Awake alert and Oriented to person, place, time and situation.    Airway patent.    Respirations are regular and unlabored.  Patient talking in full sentences.  Patient denies cough or shortness of breath.    Pulses are strong and regular with palpation.  Skin is normal color, warm and dry.   Cap refill is less than 3 seconds.  Patient denies chest pain/pressure.    Patient notes pain in left knee and a \"tightness\" in calf. Worse pain with weight bearing. Pain comes/goes over past year. CMS intact. Patient has US done last summer. No evidence of blood clots.        "

## 2023-03-18 NOTE — ED TRIAGE NOTES
Patient with worsening left knee/calf pain. Has had problems with this leg before. No hx of DVT

## 2023-03-18 NOTE — ED PROVIDER NOTES
"    History     Chief Complaint:  Leg Pain       HPI   Cheyenne Beckman is a 57 year old female with a history of hypertension, hypercholesterolemia, type II diabetes mellitus who presents with pain in her left calf and swelling in her left knee. This has been intermittent throughout the past year and recently exacerbated again. This summer she had an ultrasound done there at  which was negative for any blood clot. No fever or chills. No recent travel. No recent injury. Patient is a . She sits a lot at work, and her pain began when she started this job. She has not taken any medications for her pain.     Independent Historian: patient        Review of External Notes: 9/19/2022 urgent care visit and ultra sound.      ROS:  Review of Systems   Constitutional: Negative for chills and fever.   Musculoskeletal: Positive for joint swelling and myalgias.   All other systems reviewed and are negative.      Allergies:  No Known Allergies     Medications:    Hyzaar  Omeprazole     Past Medical History:    Hypertension   Type II diabetes mellitus   Panic attack  Iron deficiency anemia   Hypercholesterolemia     Past Surgical History:    Tubal ligation     Family History:    Mother: hypertension   Sister: hypertension, depression     Social History:  .   PCP: Laquita Walker     Physical Exam     Patient Vitals for the past 24 hrs:   BP Temp Temp src Pulse Resp SpO2 Height   03/18/23 1106 (!) 146/83 97  F (36.1  C) Temporal 103 16 99 % 1.6 m (5' 3\")        Physical Exam  General: Black female supine no respiratory distress  Msk: left leg tenderness lateral knee no redness or swelling. No effusion no ligamentous laxity.  Negative drawer sign. No edema distally. Normal CMS distally. Quadricept strength good.  Neuro: Awake, alert, and appropriate.  Skin: no rash.     Emergency Department Course     Imaging:  XR Knee Left 3 Views   Final Result   IMPRESSION: Large knee joint effusion. No " evidence of an acute fracture. Mild medial and patellofemoral compartment degenerative change.        Report per radiology    Emergency Department Course & Assessments:  Interventions:  Medications   ibuprofen (ADVIL/MOTRIN) tablet 600 mg (600 mg Oral $Given 3/18/23 1136)      Independent Interpretation (X-rays, CTs, rhythm strip):  I reviewed the X-ray which showed arthritic changes.      Social Determinants of Health affecting care:  None     Assessments:  1121 I obtained the history and examined the patient as noted above.   1208 I rechecked the patient and explained findings.     Disposition:  The patient was discharged to home.     Impression & Plan        Medical Decision Making:  This is a 57 year old who presents with left knee pain. She states she is actually been having pain in the lateral aspect of her knee and her calf since last summer. She has a new job, and she states this tends to make it worse. She has no redness, fever, or chills and denies any Trauma. She went into urgent care and had an ultrasound of the left calf which was negative. On my exam, the pain is mostly in the lateral aspect of the knee. I do not feel any obvious effusion. The knee has full range of motion, the is no ligamentous laxity and drawer signs are negative. There is no calf tenderness or distal edema to suggest vein occulusion. X-ray reveals some arthritic changes. The radiologist has read this as effusion, however, clinically I do no find an effusion that I feel would be amenable to tapping. I have given the patient ibuprofen, we have placed and ACE rap, and I have recommenced follow up with orthopedics.         Diagnosis:    ICD-10-CM    1. Left knee pain, unspecified chronicity  M25.562                     Scribe Disclosure:  I, Joshua Kjer, am serving as a scribe at 12:08 PM on 3/18/2023 to document services personally performed by Evaristo Mccullough MD based on my observations and the provider's statements to  me.    3/18/2023   Evaristo Mccullough MD Steinman, Randall Ira, MD  03/18/23 8530

## 2023-04-21 ENCOUNTER — OFFICE VISIT (OUTPATIENT)
Dept: FAMILY MEDICINE | Facility: CLINIC | Age: 58
End: 2023-04-21

## 2023-04-21 VITALS
BODY MASS INDEX: 42.3 KG/M2 | WEIGHT: 238.8 LBS | OXYGEN SATURATION: 97 % | DIASTOLIC BLOOD PRESSURE: 71 MMHG | SYSTOLIC BLOOD PRESSURE: 106 MMHG | HEART RATE: 74 BPM

## 2023-04-21 DIAGNOSIS — D50.0 IRON DEFICIENCY ANEMIA DUE TO CHRONIC BLOOD LOSS: ICD-10-CM

## 2023-04-21 DIAGNOSIS — K21.9 GASTROESOPHAGEAL REFLUX DISEASE WITHOUT ESOPHAGITIS: ICD-10-CM

## 2023-04-21 DIAGNOSIS — M25.462 EFFUSION OF LEFT KNEE: ICD-10-CM

## 2023-04-21 DIAGNOSIS — R80.9 TYPE 2 DIABETES MELLITUS WITH MICROALBUMINURIA, UNSPECIFIED WHETHER LONG TERM INSULIN USE: Primary | ICD-10-CM

## 2023-04-21 DIAGNOSIS — Z12.11 SPECIAL SCREENING FOR MALIGNANT NEOPLASMS, COLON: ICD-10-CM

## 2023-04-21 DIAGNOSIS — E66.01 MORBID OBESITY (H): ICD-10-CM

## 2023-04-21 DIAGNOSIS — I10 ESSENTIAL HYPERTENSION: ICD-10-CM

## 2023-04-21 DIAGNOSIS — E11.29 TYPE 2 DIABETES MELLITUS WITH MICROALBUMINURIA, UNSPECIFIED WHETHER LONG TERM INSULIN USE: Primary | ICD-10-CM

## 2023-04-21 LAB
% GRANULOCYTES: 55.6 % (ref 42.2–75.2)
A/C RATIO (RMG): <30
ALBUMIN- RMG: 30 (ref 0–10)
HBA1C MFR BLD: 6.7 % (ref 4–6)
HCT VFR BLD AUTO: 32.1 % (ref 35–46)
HEMOGLOBIN: 10.6 G/DL (ref 11.8–15.5)
INTERPRETATION: NORMAL
LYMPHOCYTES NFR BLD AUTO: 34.7 % (ref 20.5–51.1)
MCH RBC QN AUTO: 27.2 PG (ref 27–31)
MCHC RBC AUTO-ENTMCNC: 33.2 G/DL (ref 33–37)
MCV RBC AUTO: 82 FL (ref 80–100)
MONOCYTES NFR BLD AUTO: 9.7 % (ref 1.7–9.3)
PLATELET # BLD AUTO: 239 K/UL (ref 140–450)
RBC # BLD AUTO: 3.92 X10/CMM (ref 3.7–5.2)
URINE CREATININE - RMG: 200 (ref 0–300)
WBC # BLD AUTO: 6.2 X10/CMM (ref 3.8–11)

## 2023-04-21 PROCEDURE — 83036 HEMOGLOBIN GLYCOSYLATED A1C: CPT | Performed by: NURSE PRACTITIONER

## 2023-04-21 PROCEDURE — 82044 UR ALBUMIN SEMIQUANTITATIVE: CPT | Performed by: NURSE PRACTITIONER

## 2023-04-21 PROCEDURE — 85025 COMPLETE CBC W/AUTO DIFF WBC: CPT | Performed by: NURSE PRACTITIONER

## 2023-04-21 PROCEDURE — 99207 PR FOOT EXAM NO CHARGE: CPT | Performed by: NURSE PRACTITIONER

## 2023-04-21 PROCEDURE — 36415 COLL VENOUS BLD VENIPUNCTURE: CPT | Performed by: NURSE PRACTITIONER

## 2023-04-21 PROCEDURE — 99214 OFFICE O/P EST MOD 30 MIN: CPT | Performed by: NURSE PRACTITIONER

## 2023-04-21 RX ORDER — ROSUVASTATIN CALCIUM 10 MG/1
10 TABLET, COATED ORAL DAILY
Qty: 90 TABLET | Refills: 3 | Status: SHIPPED | OUTPATIENT
Start: 2023-04-21 | End: 2024-09-23

## 2023-04-21 RX ORDER — OMEPRAZOLE 40 MG/1
40 CAPSULE, DELAYED RELEASE ORAL DAILY PRN
Qty: 90 CAPSULE | Refills: 3 | Status: SHIPPED | OUTPATIENT
Start: 2023-04-21 | End: 2024-05-13

## 2023-04-21 RX ORDER — LOSARTAN POTASSIUM AND HYDROCHLOROTHIAZIDE 12.5; 5 MG/1; MG/1
1 TABLET ORAL DAILY
Qty: 90 TABLET | Refills: 3 | Status: SHIPPED | OUTPATIENT
Start: 2023-04-21 | End: 2024-05-13

## 2023-04-21 NOTE — PROGRESS NOTES
"Problem(s) Oriented visit        SUBJECTIVE:                                                    Cheyenne Beckman is a 57 year old female who presents to clinic today for the following health issues :    Left knee swelling and pain off and on for the past year. Unsure if there was initial trauma or injury. Seen in ED on 3/18/2023. US negative for DVT, bakers cyst. X-ray showed large effusion. Has not seen ortho provider yet.    Diabetes controlled without medication. Does not check BG at home.   Lab Results   Component Value Date    A1C 6.7 04/21/2023    A1C 6.9 09/19/2022    A1C 6.5 10/22/2021    A1C 6.6 07/02/2020    A1C 6.6 06/06/2019     Hypertension controlled with Losartan-hydrochlorothiazide 50-12.5 mg daily without side effects  BP Readings from Last 3 Encounters:   04/21/23 106/71   03/18/23 (!) 146/83   09/19/22 112/72       ARDEN's needed:   Mammogram yearly @ [facility]  Dr. [facility] @ [facility] for pap 12/2022  [facility] [place] for diabetic eye exam     Problem list, Medication list, Allergies, and Medical/Social/Surgical histories reviewed in [employer] and updated as appropriate.   Additional history: as documented    ROS:  10 point ROS completed and negative except noted above, including Gen, HEENT, CV, Resp, GI, , MS, Neurologic, Psych    OBJECTIVE:                                                    /71   Pulse 74   Wt 108.3 kg (238 lb 12.8 oz)   SpO2 97%   BMI 42.30 kg/m    Body mass index is 42.3 kg/m .   GENERAL: healthy, alert and no distress  RESP: calm regular breathing and no cough  CV: normal rate, bilateral 1+ lower extremity edema  MS: left knee lateral effusion, decreased mobility  NEURO: no gross deficits  PSYCH: normal affect & mood     ASSESSMENT/PLAN:                                                      BMI:   Estimated body mass index is 42.3 kg/m  as calculated from the following:    Height as of 3/18/23: 1.6 m (5' 3\").    Weight as of this " encounter: 108.3 kg (238 lb 12.8 oz).   Weight management plan: Discussed healthy diet and exercise guidelines      Cheyenne was seen today for diabetes, x-ray f/u and health maintenance.    Diagnoses and all orders for this visit:    Type 2 diabetes mellitus with microalbuminuria, unspecified whether long term insulin use (H)  -     Hemoglobin A1C ([facility])  -     VENOUS COLLECTION  -     FOOT EXAM  -     Basic Metabolic Panel (8) ([facility])  -     Microalbumin ([facility])  -     rosuvastatin (CRESTOR) 10 MG tablet; Take 1 tablet (10 mg) by mouth daily  Meeting A1C goal of <7.0. Continue current regimen with diet & exercise. Discussed importance in compliance in all areas of diabetic control including diet, routine BS checks, absolute medication compliance, laboratory monitoring, and attending regular follow up appointments as ordered.  Failure to comply with instructions regarding diabetes will lead to a greater chance of poor diabetic control and therefore a greater chance of diabetes related complications such as CAD, CVA, PVD, and retinopathy/neuropathy/nephropathy. Based on level of diabetes control: Glucose testing Not indicated. Return to the clinic in every 6 months.    Morbid obesity (H)  Recommend diet & exercise to aid with weight loss. Declines GLP-1 at this time  Wt Readings from Last 5 Encounters:   04/21/23 108.3 kg (238 lb 12.8 oz)   09/19/22 105.1 kg (231 lb 12.8 oz)   10/22/21 102.7 kg (226 lb 6.4 oz)   07/02/20 107 kg (236 lb)   06/09/20 106.6 kg (235 lb)     Essential hypertension  -     VENOUS COLLECTION  -     losartan-hydrochlorothiazide (HYZAAR) 50-12.5 MG tablet; Take 1 tablet by mouth daily  Reviewed current HTN management. Blood pressure is controlled with current medication(s). Goal BP <130/80. We today managed prescriptions with refills ensured to ensure availabilty of current medications. Reviewed lifestyle modifications. Required intervals for follow up on HTN, lab studies reviewed. Strongly  recommened blood pressures are checked outside the clinic to ensure that BPs are remaining within guidelines. Instructed to contact me if the readings are not within guidelines on a regular basis so we can adjust treatment as needed. Reviewed side effects of medications, alarm signs and symptoms, and when to seek further care.    Effusion of left knee  -     Orthopedic  Referral; Future  Referral to JEANE Crane for further evaluation & management    Gastroesophageal reflux disease without esophagitis  -     omeprazole (PRILOSEC) 40 MG DR capsule; Take 1 capsule (40 mg) by mouth daily as needed (acid reflux) TAKE 1 CAPSULE (40 MG) BY MOUTH DAILY  Refill of medication sent to take prn    Iron deficiency anemia due to chronic blood loss  -     VENOUS COLLECTION  -     CBC with Diff/Plt ([facility])  Checking hemoglobin to ensure stability    Special screening for malignant neoplasms, colon  -     Colonoscopy Screening  Referral; Future        See Patient Instructions  Patient Instructions   Schedule with knee ortho specialist @ [facility]  [phone]    Start Rosuvastatin (Crestor) 1 pill daily in the evening. Okay to take with other pills.    Continue to work on eating fewer carbohydrates, more vegetables & protein    Drink lots of water.       GYPSY Martin CNP  [facility]  Family Practice  [facility]  [phone]    For any issues my office # is [phone]

## 2023-04-21 NOTE — PATIENT INSTRUCTIONS
Schedule with knee ortho specialist @ [facility]  [phone]    Start Rosuvastatin (Crestor) 1 pill daily in the evening. Okay to take with other pills.    Continue to work on eating fewer carbohydrates, more vegetables & protein    Drink lots of water.

## 2023-04-22 LAB
BUN SERPL-MCNC: 13 MG/DL (ref 6–24)
BUN/CREATININE RATIO: 17 (ref 9–23)
CALCIUM SERPL-MCNC: 9.2 MG/DL (ref 8.7–10.2)
CHLORIDE SERPLBLD-SCNC: 101 MMOL/L (ref 96–106)
CREAT SERPL-MCNC: 0.77 MG/DL (ref 0.57–1)
EGFR: 91 ML/MIN/1.73
GLUCOSE SERPL-MCNC: 111 MG/DL (ref 70–99)
POTASSIUM SERPL-SCNC: 4.1 MMOL/L (ref 3.5–5.2)
SODIUM SERPL-SCNC: 140 MMOL/L (ref 134–144)
TOTAL CO2: 27 MMOL/L (ref 20–29)

## 2023-04-23 ENCOUNTER — HEALTH MAINTENANCE LETTER (OUTPATIENT)
Age: 58
End: 2023-04-23

## 2023-05-01 ENCOUNTER — TRANSFERRED RECORDS (OUTPATIENT)
Dept: FAMILY MEDICINE | Facility: CLINIC | Age: 58
End: 2023-05-01

## 2023-07-22 ENCOUNTER — HEALTH MAINTENANCE LETTER (OUTPATIENT)
Age: 58
End: 2023-07-22

## 2023-12-09 ENCOUNTER — HEALTH MAINTENANCE LETTER (OUTPATIENT)
Age: 58
End: 2023-12-09

## 2023-12-22 ENCOUNTER — TRANSFERRED RECORDS (OUTPATIENT)
Dept: FAMILY MEDICINE | Facility: CLINIC | Age: 58
End: 2023-12-22

## 2024-04-27 ENCOUNTER — HEALTH MAINTENANCE LETTER (OUTPATIENT)
Age: 59
End: 2024-04-27

## 2024-05-13 DIAGNOSIS — K21.9 GASTROESOPHAGEAL REFLUX DISEASE WITHOUT ESOPHAGITIS: ICD-10-CM

## 2024-05-13 DIAGNOSIS — I10 ESSENTIAL HYPERTENSION: ICD-10-CM

## 2024-05-13 RX ORDER — OMEPRAZOLE 40 MG/1
40 CAPSULE, DELAYED RELEASE ORAL DAILY PRN
Qty: 90 CAPSULE | Refills: 3 | Status: SHIPPED | OUTPATIENT
Start: 2024-05-13

## 2024-05-13 RX ORDER — LOSARTAN POTASSIUM AND HYDROCHLOROTHIAZIDE 12.5; 5 MG/1; MG/1
1 TABLET ORAL DAILY
Qty: 90 TABLET | Refills: 3 | Status: SHIPPED | OUTPATIENT
Start: 2024-05-13

## 2024-05-13 NOTE — TELEPHONE ENCOUNTER
Med: omeprazole, Losartan hydrochlorothiazide        LOV (related): 4/21/23      Due for F/U around:  overdue for CPX     Next Appt: No current future appointments scheduled           BP Readings from Last 3 Encounters:   04/21/23 106/71   03/18/23 (!) 146/83   09/19/22 112/72       Last Comprehensive Metabolic Panel:  Lab Results   Component Value Date     04/21/2023    POTASSIUM 4.1 04/21/2023    CHLORIDE 101 04/21/2023     (H) 04/21/2023    BUN 13 04/21/2023    BUN 17 04/21/2023    CR 0.77 04/21/2023    SUZANNE 9.2 04/21/2023

## 2024-06-01 ENCOUNTER — TRANSFERRED RECORDS (OUTPATIENT)
Dept: MULTI SPECIALTY CLINIC | Facility: CLINIC | Age: 59
End: 2024-06-01

## 2024-07-01 LAB — RETINOPATHY: NORMAL

## 2024-09-14 ENCOUNTER — HEALTH MAINTENANCE LETTER (OUTPATIENT)
Age: 59
End: 2024-09-14

## 2024-09-23 ENCOUNTER — OFFICE VISIT (OUTPATIENT)
Dept: FAMILY MEDICINE | Facility: CLINIC | Age: 59
End: 2024-09-23

## 2024-09-23 VITALS
OXYGEN SATURATION: 99 % | HEIGHT: 63 IN | DIASTOLIC BLOOD PRESSURE: 78 MMHG | HEART RATE: 84 BPM | SYSTOLIC BLOOD PRESSURE: 129 MMHG | BODY MASS INDEX: 43.12 KG/M2 | WEIGHT: 243.4 LBS

## 2024-09-23 DIAGNOSIS — K21.9 GASTROESOPHAGEAL REFLUX DISEASE WITHOUT ESOPHAGITIS: ICD-10-CM

## 2024-09-23 DIAGNOSIS — Z86.2 HISTORY OF ANEMIA: ICD-10-CM

## 2024-09-23 DIAGNOSIS — E66.01 MORBID OBESITY (H): ICD-10-CM

## 2024-09-23 DIAGNOSIS — Z13.6 SCREENING FOR CARDIOVASCULAR CONDITION: ICD-10-CM

## 2024-09-23 DIAGNOSIS — Z00.00 ROUTINE GENERAL MEDICAL EXAMINATION AT A HEALTH CARE FACILITY: Primary | ICD-10-CM

## 2024-09-23 DIAGNOSIS — E11.29 TYPE 2 DIABETES MELLITUS WITH MICROALBUMINURIA (H): ICD-10-CM

## 2024-09-23 DIAGNOSIS — R80.9 TYPE 2 DIABETES MELLITUS WITH MICROALBUMINURIA (H): ICD-10-CM

## 2024-09-23 DIAGNOSIS — I10 ESSENTIAL HYPERTENSION: ICD-10-CM

## 2024-09-23 LAB
% GRANULOCYTES: 54.3 % (ref 42.2–75.2)
ALBUMIN SERPL BCG-MCNC: 4.1 G/DL (ref 3.5–5.2)
ALP SERPL-CCNC: 124 U/L (ref 40–150)
ALT SERPL W P-5'-P-CCNC: 23 U/L (ref 0–50)
ANION GAP SERPL CALCULATED.3IONS-SCNC: 12 MMOL/L (ref 7–15)
AST SERPL W P-5'-P-CCNC: 29 U/L (ref 0–45)
BILIRUB SERPL-MCNC: 0.4 MG/DL
BUN SERPL-MCNC: 10.2 MG/DL (ref 6–20)
CALCIUM SERPL-MCNC: 9.6 MG/DL (ref 8.8–10.4)
CHLORIDE SERPL-SCNC: 100 MMOL/L (ref 98–107)
CHOLESTEROL: 180 MG/DL (ref 100–199)
CREAT SERPL-MCNC: 0.79 MG/DL (ref 0.51–0.95)
EGFRCR SERPLBLD CKD-EPI 2021: 87 ML/MIN/1.73M2
FASTING STATUS PATIENT QL REPORTED: YES
FASTING?: YES
GLUCOSE SERPL-MCNC: 155 MG/DL (ref 70–99)
HBA1C MFR BLD: 7.2 % (ref 4–6)
HCO3 SERPL-SCNC: 26 MMOL/L (ref 22–29)
HCT VFR BLD AUTO: 35.1 % (ref 35–46)
HDL (RMG): 42 MG/DL (ref 40–?)
HEMOGLOBIN: 11.3 G/DL (ref 11.8–15.5)
LDL CALCULATED (RMG): 107 MG/DL (ref 0–130)
LYMPHOCYTES NFR BLD AUTO: 37.8 % (ref 20.5–51.1)
MCH RBC QN AUTO: 27.3 PG (ref 27–31)
MCHC RBC AUTO-ENTMCNC: 32.4 G/DL (ref 33–37)
MCV RBC AUTO: 84.2 FL (ref 80–100)
MONOCYTES NFR BLD AUTO: 7.9 % (ref 1.7–9.3)
PLATELET # BLD AUTO: 239 K/UL (ref 140–450)
POTASSIUM SERPL-SCNC: 3.7 MMOL/L (ref 3.4–5.3)
PROT SERPL-MCNC: 7.9 G/DL (ref 6.4–8.3)
RBC # BLD AUTO: 4.16 X10/CMM (ref 3.7–5.2)
SODIUM SERPL-SCNC: 138 MMOL/L (ref 135–145)
TRIGLYCERIDES (RMG): 155 MG/DL (ref 0–149)
WBC # BLD AUTO: 6.2 X10/CMM (ref 3.8–11)

## 2024-09-23 PROCEDURE — 99214 OFFICE O/P EST MOD 30 MIN: CPT | Mod: 25

## 2024-09-23 PROCEDURE — 84295 ASSAY OF SERUM SODIUM: CPT | Mod: ORL

## 2024-09-23 PROCEDURE — 82044 UR ALBUMIN SEMIQUANTITATIVE: CPT

## 2024-09-23 PROCEDURE — 85025 COMPLETE CBC W/AUTO DIFF WBC: CPT

## 2024-09-23 PROCEDURE — 36415 COLL VENOUS BLD VENIPUNCTURE: CPT

## 2024-09-23 PROCEDURE — 80061 LIPID PANEL: CPT | Mod: QW

## 2024-09-23 PROCEDURE — 99396 PREV VISIT EST AGE 40-64: CPT | Mod: 25

## 2024-09-23 PROCEDURE — 83036 HEMOGLOBIN GLYCOSYLATED A1C: CPT

## 2024-09-23 RX ORDER — METFORMIN HCL 500 MG
1000 TABLET, EXTENDED RELEASE 24 HR ORAL 2 TIMES DAILY WITH MEALS
Qty: 120 TABLET | Refills: 2 | Status: SHIPPED | OUTPATIENT
Start: 2024-09-23

## 2024-09-23 RX ORDER — ROSUVASTATIN CALCIUM 10 MG/1
10 TABLET, COATED ORAL DAILY
Qty: 90 TABLET | Refills: 3 | Status: SHIPPED | OUTPATIENT
Start: 2024-09-23

## 2024-09-23 NOTE — PATIENT INSTRUCTIONS
Metformin Titration Schedule    Week 1: 500mg with dinner  Week 2: 500mg with breakfast and dinner  Week 3: 500mg with breakfast and 1,000mg with dinner  Week 4: 1,000mg with breakfast and dinner    If you reach a dose where you start having side effects (nausea, diarrhea, abdominal pain, etc), please do NOT increase the dose again until these side effects are gone, as they will get worse if you increase the dose too soon. Stay at your current dose until side effects are absent, then move on to the next week.     Recheck A1c in 3 months.     As always, call the clinic with questions.       Patient Education   Preventive Care Advice   This is general advice given by our system to help you stay healthy. However, your care team may have specific advice just for you. Please talk to your care team about your preventive care needs.  Nutrition  Eat 5 or more servings of fruits and vegetables each day.  Try wheat bread, brown rice and whole grain pasta (instead of white bread, rice, and pasta).  Get enough calcium and vitamin D. Check the label on foods and aim for 100% of the RDA (recommended daily allowance).  Lifestyle  Exercise at least 150 minutes each week  (30 minutes a day, 5 days a week).  Do muscle strengthening activities 2 days a week. These help control your weight and prevent disease.  No smoking.  Wear sunscreen to prevent skin cancer.  Have a dental exam and cleaning every 6 months.  Yearly exams  See your health care team every year to talk about:  Any changes in your health.  Any medicines your care team has prescribed.  Preventive care, family planning, and ways to prevent chronic diseases.  Shots (vaccines)   HPV shots (up to age 26), if you've never had them before.  Hepatitis B shots (up to age 59), if you've never had them before.  COVID-19 shot: Get this shot when it's due.  Flu shot: Get a flu shot every year.  Tetanus shot: Get a tetanus shot every 10 years.  Pneumococcal, hepatitis A, and RSV  shots: Ask your care team if you need these based on your risk.  Shingles shot (for age 50 and up)  General health tests  Diabetes screening:  Starting at age 35, Get screened for diabetes at least every 3 years.  If you are younger than age 35, ask your care team if you should be screened for diabetes.  Cholesterol test: At age 39, start having a cholesterol test every 5 years, or more often if advised.  Bone density scan (DEXA): At age 50, ask your care team if you should have this scan for osteoporosis (brittle bones).  Hepatitis C: Get tested at least once in your life.  STIs (sexually transmitted infections)  Before age 24: Ask your care team if you should be screened for STIs.  After age 24: Get screened for STIs if you're at risk. You are at risk for STIs (including HIV) if:  You are sexually active with more than one person.  You don't use condoms every time.  You or a partner was diagnosed with a sexually transmitted infection.  If you are at risk for HIV, ask about PrEP medicine to prevent HIV.  Get tested for HIV at least once in your life, whether you are at risk for HIV or not.  Cancer screening tests  Cervical cancer screening: If you have a cervix, begin getting regular cervical cancer screening tests starting at age 21.  Breast cancer scan (mammogram): If you've ever had breasts, begin having regular mammograms starting at age 40. This is a scan to check for breast cancer.  Colon cancer screening: It is important to start screening for colon cancer at age 45.  Have a colonoscopy test every 10 years (or more often if you're at risk) Or, ask your provider about stool tests like a FIT test every year or Cologuard test every 3 years.  To learn more about your testing options, visit:   .  For help making a decision, visit:   https://bit.ly/yi48246.  Prostate cancer screening test: If you have a prostate, ask your care team if a prostate cancer screening test (PSA) at age 55 is right for you.  Lung cancer  screening: If you are a current or former smoker ages 50 to 80, ask your care team if ongoing lung cancer screenings are right for you.  For informational purposes only. Not to replace the advice of your health care provider. Copyright   2023 [facility]. All rights reserved. Clinically reviewed by the [facility]. [employer] [id number] - REV 01/24.

## 2024-09-23 NOTE — PROGRESS NOTES
Preventive Care Visit  [facility]  GYPSY Booth CNP, Family Medicine  Sep 23, 2024      Assessment & Plan     Routine general medical examination at a health care facility  Age-appropriate preventative health maintenance along with diet, exercise and healthy weight discussed.     Morbid obesity (H)  Discussed importance of a healthy weight, along with diet and exercise     Type 2 diabetes mellitus with microalbuminuria (H)  Cheyenne Beckman is not meeting A1C goal of <7.0. Will start Metformin 1000 mg BID. Titration schedule given. Education about adverse effects of prescription medication discussed. Discussed importance in compliance in all areas of diabetic control including diet, routine BS checks, absolute medication compliance, laboratory monitoring, and attending regular follow up appointments as ordered.  Failure to comply with instructions regarding diabetes will lead to a greater chance of poor diabetic control and therefore a greater chance of diabetes related complications such as CAD, CVA, PVD, and retinopathy/ neuropathy/ nephropathy. Based on level of diabetes control: Return to the clinic in every 3 months. Patient agreeable to plan. All questions answered.   - Microalbumin ([facility])  - Hemoglobin A1C ([facility])  - FOOT EXAM  - metFORMIN (GLUCOPHAGE XR) 500 MG 24 hr tablet  Dispense: 120 tablet; Refill: 2  - rosuvastatin (CRESTOR) 10 MG tablet  Dispense: 90 tablet; Refill: 3    Essential hypertension  Reviewed current HTN management. Blood pressure is controlled with current medication(s). Taking losartan-hydrochlorothiazide 50-12.5 mg daily. Goal BP <140/90. We today managed prescriptions with refills ensured to ensure availabilty of current medications. Reviewed lifestyle modifications. Required intervals for follow up on HTN, lab studies reviewed. Strongly recommened blood pressures are checked outside the clinic to ensure that BPs are remaining within guidelines. Instructed to  "contact me if the readings are not within guidelines on a regular basis so we can adjust treatment as needed. Reviewed side effects of medications, alarm signs and symptoms, and when to seek further care.  - VENOUS COLLECTION  - Comprehensive metabolic panel  - Comprehensive metabolic panel    Gastroesophageal reflux disease without esophagitis  Taking Omeprazole as needed. Stable/controlled. Continue current plan.    History of anemia  Stable. Will recheck.   - CBC with Diff/Plt ([facility])    Screening for cardiovascular condition  - Lipid Profile ([facility])  - VENOUS COLLECTION      Patient has been advised of split billing requirements and indicates understanding: Yes        BMI  Estimated body mass index is 43.12 kg/m  as calculated from the following:    Height as of this encounter: 1.6 m (5' 3\").    Weight as of this encounter: 110.4 kg (243 lb 6.4 oz).   Weight management plan: Discussed healthy diet and exercise guidelines    Counseling  Appropriate preventive services were addressed with this patient via screening, questionnaire, or discussion as appropriate for fall prevention, nutrition, physical activity, Tobacco-use cessation, social engagement, weight loss and cognition.  Checklist reviewing preventive services available has been given to the patient.  Reviewed patient's diet, addressing concerns and/or questions.   She is at risk for lack of exercise and has been provided with information to increase physical activity for the benefit of her well-being.   She is at risk for psychosocial distress and has been provided with information to reduce risk.       Work on weight loss  Regular exercise  See Patient Instructions    Return in about 53 weeks (around 9/29/2025) for Annual Wellness Visit.    Subjective   Cheyenne is a 59 year old, presenting for the following:  Physical (Fasting/A1c today /Vaccines: Declined) and Back Pain (Lower back pain, not sure if it is due to Medication )         Health Care " Directive  Patient does not have a Health Care Directive or Living Will: Discussed advance care planning with patient; however, patient declined at this time.    HPI    T2DM: not on medication, not consistent with exercising, watching diet. Given Rx for Rosuvastatin 10 mg daily at visit last year but never started. Had done last melissa at [facility]. Foot exam: due. Noticed going to the bathroom more. Weight gain with low back pain    HTN: losartan-hydrochlorothiazide 50-12.5 mg daily, hasn't been checking it at home as much but has monitor.     GERD: Omeprazole as needed. Takes depending on what food she eats.       Health maintenance:  Mammogram: had one this past year, off herlinda. Also, follows with [facility]   Pap: Normal last in 5/2023. Follows with [facility]   Colon: last dec 12/2023          9/23/2024   General Health   How would you rate your overall physical health? Good   Feel stress (tense, anxious, or unable to sleep) Only a little      (!) STRESS CONCERN      9/23/2024   Nutrition   Three or more servings of calcium each day? (!) NO   Diet: Regular (no restrictions)   How many servings of fruit and vegetables per day? (!) 0-1   How many sweetened beverages each day? 0-1            9/23/2024   Exercise   Days per week of moderate/strenous exercise 3 days   Average minutes spent exercising at this level 30 min            9/23/2024   Social Factors   Frequency of gathering with friends or relatives Three times a week   Worry food won't last until get money to buy more No   Food not last or not have enough money for food? No   Do you have housing? (Housing is defined as stable permanent housing and does not include staying ouside in a car, in a tent, in an abandoned building, in an overnight shelter, or couch-surfing.) Yes   Are you worried about losing your housing? No   Lack of transportation? No   Unable to get utilities (heat,electricity)? No             9/23/2024   Fall Risk   Fallen 2 or more times in the past year? No   Trouble with walking or balance? No             9/23/2024   Dental   Dentist two times every year? Yes            9/23/2024   TB Screening   Were you born outside of the [place]? No      Hearing    Left:  500Hz: Pass  1000Hz: Pass  2000Hz: Pass  4000Hz: Pass    Right:  500Hz: Pass  1000Hz: Pass  2000Hz: Pass  4000Hz: Pass        Today's PHQ-2 Score:       9/23/2024     9:40 AM   PHQ-2 ( 1999 Pf[employer])   Q1: Little interest or pleasure in doing things 0   Q2: Feeling down, depressed or hopeless 0   PHQ-2 Score 0           9/23/2024   Substance Use   Alcohol more than 3/day or more than 7/wk No   Do you use any other substances recreationally? No        Social History     Tobacco Use    Smoking status: Never    Smokeless tobacco: Never   Vaping Use    Vaping status: Never Used   Substance Use Topics    Alcohol use: Yes     Alcohol/week: 0.0 - 1.0 standard drinks of alcohol     Comment: social - rarely    Drug use: No             9/23/2024   Breast Cancer Screening   Family history of breast, colon, or ovarian cancer? No / Unknown         Mammogram Screening - Mammogram every 1-2 years updated in Health Maintenance based on mutual decision making        9/23/2024   STI Screening   New sexual partner(s) since last STI/HIV test? No        History of abnormal Pap smear: No - age 30- 64 PAP with HPV every 5 years recommended        12/30/2021    12:00 AM 11/25/2015    12:00 AM   PAP / HPV   PAP-ABSTRACT See Scanned Document     See Scanned Document           This result is from an external source.     ASCVD Risk   The 10-year ASCVD risk score (Shila BARR, et al., 2019) is: 14.9%    Values used to calculate the score:      Age: 59 years      Sex: Female      Is Non- : Yes      Diabetic: Yes      Tobacco smoker: No      Systolic Blood Pressure: 129 mmHg      Is BP treated: Yes      HDL Cholesterol: 42 mg/dL      Total Cholesterol:  "180 mg/dL         Reviewed and updated as needed this visit by Provider   Tobacco  Allergies  Meds  Problems  Med Hx  Surg Hx  Fam Hx            Past Medical History:   Diagnosis Date    Hypertension     Iron deficiency anemia due to chronic blood loss 9/1/2017    Panic attack     Type 2 diabetes mellitus (H)      Past Surgical History:   Procedure Laterality Date    TUBAL LIGATION       Lab work is in process      Review of Systems  Constitutional, HEENT, cardiovascular, pulmonary, GI, , musculoskeletal, neuro, skin, endocrine and psych systems are negative, except as otherwise noted.     Objective    Exam  /78   Pulse 84   Ht 1.6 m (5' 3\")   Wt 110.4 kg (243 lb 6.4 oz)   LMP 09/15/2021   SpO2 99%   BMI 43.12 kg/m     Estimated body mass index is 43.12 kg/m  as calculated from the following:    Height as of this encounter: 1.6 m (5' 3\").    Weight as of this encounter: 110.4 kg (243 lb 6.4 oz).    Physical Exam  GENERAL: alert and no distress  EYES: Eyes grossly normal to inspection, PERRL and conjunctivae and sclerae normal  HENT: ear canals and TM's normal, nose and mouth without ulcers or lesions  NECK: no adenopathy, no asymmetry, masses, or scars  RESP: lungs clear to auscultation - no rales, rhonchi or wheezes  BREAST: deferred, Will UTD mammogram results  CV: regular rate and rhythm, normal S1 S2, no S3 or S4, no murmur, click or rub, no peripheral edema  ABDOMEN: soft, nontender, no hepatosplenomegaly, no masses and bowel sounds normal  MS: no gross musculoskeletal defects noted, no edema  SKIN: no suspicious lesions or rashes  NEURO: Normal strength and tone, mentation intact and speech normal  PSYCH: mentation appears normal, affect normal/bright  Diabetic foot exam: normal DP and PT pulses, no trophic changes or ulcerative lesions, and normal sensory exam        Signed Electronically by: GYPSY Booth CNP    "

## 2024-09-25 LAB
A/C RATIO (RMG): <30
ALBUMIN- RMG: 10 (ref 0–10)
INTERPRETATION: NORMAL
URINE CREATININE - RMG: 50 (ref 0–300)

## 2024-10-16 DIAGNOSIS — R80.9 TYPE 2 DIABETES MELLITUS WITH MICROALBUMINURIA (H): ICD-10-CM

## 2024-10-16 DIAGNOSIS — E11.29 TYPE 2 DIABETES MELLITUS WITH MICROALBUMINURIA (H): ICD-10-CM

## 2024-10-16 NOTE — CONFIDENTIAL NOTE
Med: METFORMIN  REQUESTING 90 DAY SUPPLY    LOV (related): 9/23/24  - CPX    Last Lab: 9/23/24      Due for F/U around: 12/2024    Next Appt: NONE        Lab Results   Component Value Date    A1C 7.2 09/23/2024    A1C 6.7 04/21/2023    A1C 6.9 09/19/2022    A1C 6.5 10/22/2021    A1C 6.6 07/02/2020

## 2024-10-17 RX ORDER — METFORMIN HYDROCHLORIDE 500 MG/1
1000 TABLET, EXTENDED RELEASE ORAL 2 TIMES DAILY WITH MEALS
Qty: 360 TABLET | Refills: 1 | Status: SHIPPED | OUTPATIENT
Start: 2024-10-17

## 2024-12-20 DIAGNOSIS — R80.9 TYPE 2 DIABETES MELLITUS WITH MICROALBUMINURIA (H): ICD-10-CM

## 2024-12-20 DIAGNOSIS — E11.29 TYPE 2 DIABETES MELLITUS WITH MICROALBUMINURIA (H): ICD-10-CM

## 2024-12-23 RX ORDER — METFORMIN HYDROCHLORIDE 500 MG/1
1000 TABLET, EXTENDED RELEASE ORAL 2 TIMES DAILY WITH MEALS
Qty: 360 TABLET | Refills: 1 | Status: SHIPPED | OUTPATIENT
Start: 2024-12-23

## 2024-12-23 NOTE — TELEPHONE ENCOUNTER
Med: Metformin    LOV (related): 9/23/24    Last Lab: 9/23/24      Due for F/U around: 12/23/24    Next Appt: 12/27/24        Lab Results   Component Value Date    A1C 7.2 09/23/2024    A1C 6.7 04/21/2023    A1C 6.9 09/19/2022    A1C 6.5 10/22/2021    A1C 6.6 07/02/2020

## 2025-01-05 ENCOUNTER — HEALTH MAINTENANCE LETTER (OUTPATIENT)
Age: 60
End: 2025-01-05

## 2025-02-20 ENCOUNTER — OFFICE VISIT (OUTPATIENT)
Dept: FAMILY MEDICINE | Facility: CLINIC | Age: 60
End: 2025-02-20

## 2025-02-20 VITALS
BODY MASS INDEX: 41.89 KG/M2 | DIASTOLIC BLOOD PRESSURE: 64 MMHG | SYSTOLIC BLOOD PRESSURE: 117 MMHG | WEIGHT: 236.4 LBS | OXYGEN SATURATION: 100 % | HEART RATE: 94 BPM | TEMPERATURE: 97.9 F | HEIGHT: 63 IN

## 2025-02-20 DIAGNOSIS — J01.00 ACUTE NON-RECURRENT MAXILLARY SINUSITIS: Primary | ICD-10-CM

## 2025-02-20 NOTE — PROGRESS NOTES
Assessment & Plan     Acute non-recurrent maxillary sinusitis  S/s consistent with a sinus infection. Rx for Augmentin.   Discussed main side effects from antibiotics can be GI upset, loose stools, etc   Recommend taking with food to help reduce GI complications from antibiotic use.  Call if any serious diarrhea develops within the next several weeks of taking this antibiotics.   Recommended symptomatic cares such as decongestants, expectorants, steroid nasal spray for next few weeks, over the counter anti-inflammatory medications, and/or nasal/sinus lavage with Netti pot or Sinus Rinse Kit. Decongestant nasal sprays can be used, but use with caution and not for more than 3 days. Follow up as needed for new or worsening symptoms or if symptoms fail to improve. Patient agreeable to plan. All questions answered.   - amoxicillin-clavulanate (AUGMENTIN) 875-125 MG tablet  Dispense: 14 tablet; Refill: 0            Work on weight loss  Regular exercise  See Patient Instructions  Follow up for DM visit.   Return if symptoms worsen or fail to improve, for Follow up.    Subjective   Cheyenne is a 59 year old, presenting for the following health issues:  URI (Pt is complaining of cough (worsens at night), drainage, wheezing, stuffy nose, pressure in head, pain in lower L abdomen, and occasional fevers /Denies chest pain, and SOB/Sx onset: Around 2-3 weeks ago/Covid testing at home around 3 weeks ago, and was negative )    History of Present Illness       Reason for visit:  Coughing, nasal congestion  Symptom onset:  3-4 weeks ago  Symptoms include:  Cough, stuffy runny nose, mucus in throat  Symptom intensity:  Moderate  Symptom progression:  Staying the same  Had these symptoms before:  Yes  Has tried/received treatment for these symptoms:  Yes  Previous treatment was successful:  No  What makes it worse:  No  What makes it better:  No   She is taking medications regularly.         Concern - URI  Onset: 3 weeks  Description:  "Started with a stomach ache and diarrhea in the beginning. Went to [facility] and cough, congestion, pressure, chills started. Chills and stomach only a couple days. Covid negative. Now just cough and mucus. Hoarse and dry through. Not colorful mucus. No chest pain and shortness of breath  Intensity: slight side pain, very mild-coughing muscles are sore  Progression of Symptoms:  improving  Accompanying Signs & Symptoms: cough, congestion. No fevers.   Previous history of similar problem: especially with throat, sinus infection  Precipitating factors:        Worsened by: Cough worse at night.   Alleviating factors:        Improved by: cough medicine   Therapies tried and outcome: tea, garlic, allergy meds, vicks, mucinex spray        Review of Systems  Constitutional, HEENT, cardiovascular, pulmonary, gi and gu systems are negative, except as otherwise noted.      Objective    /64   Pulse 94   Temp 97.9  F (36.6  C)   Ht 1.6 m (5' 3\")   Wt 107.2 kg (236 lb 6.4 oz)   LMP 09/15/2021   SpO2 100%   BMI 41.88 kg/m    Body mass index is 41.88 kg/m .  Physical Exam   GENERAL: alert and no distress  HENT: normal cephalic/atraumatic, both ears: clear effusion, nose and mouth without ulcers or lesions, nasal mucosa edematous , rhinorrhea clear, oropharynx clear, oral mucous membranes moist, and sinuses: maxillary tenderness on both sides  NECK: no adenopathy  RESP: lungs clear to auscultation - no rales, rhonchi or wheezes  CV: regular rate and rhythm, normal S1 S2, no S3 or S4, no murmur, click or rub, no peripheral edema  PSYCH: mentation appears normal, affect normal/bright          Signed Electronically by: GYPSY Booth CNP    "

## 2025-02-26 ENCOUNTER — TRANSFERRED RECORDS (OUTPATIENT)
Dept: FAMILY MEDICINE | Facility: CLINIC | Age: 60
End: 2025-02-26

## 2025-04-20 ENCOUNTER — HEALTH MAINTENANCE LETTER (OUTPATIENT)
Age: 60
End: 2025-04-20

## 2025-05-11 ENCOUNTER — HEALTH MAINTENANCE LETTER (OUTPATIENT)
Age: 60
End: 2025-05-11

## 2025-07-01 ENCOUNTER — TRANSFERRED RECORDS (OUTPATIENT)
Dept: FAMILY MEDICINE | Facility: CLINIC | Age: 60
End: 2025-07-01

## 2025-08-03 ENCOUNTER — HEALTH MAINTENANCE LETTER (OUTPATIENT)
Age: 60
End: 2025-08-03

## 2025-09-02 ENCOUNTER — OFFICE VISIT (OUTPATIENT)
Age: 60
End: 2025-09-02

## 2025-09-02 VITALS
OXYGEN SATURATION: 100 % | BODY MASS INDEX: 40.07 KG/M2 | HEART RATE: 75 BPM | WEIGHT: 226.2 LBS | DIASTOLIC BLOOD PRESSURE: 69 MMHG | SYSTOLIC BLOOD PRESSURE: 129 MMHG

## 2025-09-02 DIAGNOSIS — E66.813 CLASS 3 SEVERE OBESITY DUE TO EXCESS CALORIES WITH SERIOUS COMORBIDITY AND BODY MASS INDEX (BMI) OF 40.0 TO 44.9 IN ADULT (H): ICD-10-CM

## 2025-09-02 DIAGNOSIS — E11.9 TYPE 2 DIABETES MELLITUS WITHOUT COMPLICATION, WITHOUT LONG-TERM CURRENT USE OF INSULIN (H): Primary | ICD-10-CM

## 2025-09-02 DIAGNOSIS — I10 ESSENTIAL HYPERTENSION: ICD-10-CM

## 2025-09-02 DIAGNOSIS — K21.9 GASTROESOPHAGEAL REFLUX DISEASE WITHOUT ESOPHAGITIS: ICD-10-CM

## 2025-09-02 LAB
ALBUMIN SERPL BCG-MCNC: 3.9 G/DL (ref 3.5–5.2)
ALP SERPL-CCNC: 124 U/L (ref 40–150)
ALT SERPL W P-5'-P-CCNC: 16 U/L (ref 0–50)
ANION GAP SERPL CALCULATED.3IONS-SCNC: 13 MMOL/L (ref 7–15)
AST SERPL W P-5'-P-CCNC: 21 U/L (ref 0–45)
BILIRUB SERPL-MCNC: 0.4 MG/DL
BUN SERPL-MCNC: 14.7 MG/DL (ref 6–20)
CALCIUM SERPL-MCNC: 9.4 MG/DL (ref 8.8–10.4)
CHLORIDE SERPL-SCNC: 100 MMOL/L (ref 98–107)
CHOLESTEROL: 177 MG/DL (ref 100–199)
CREAT SERPL-MCNC: 0.84 MG/DL (ref 0.51–0.95)
EGFRCR SERPLBLD CKD-EPI 2021: 80 ML/MIN/1.73M2
FASTING STATUS PATIENT QL REPORTED: NO
FASTING?: NO
GLUCOSE SERPL-MCNC: 107 MG/DL (ref 70–99)
HBA1C MFR BLD: 6.4 % (ref 4–6)
HCO3 SERPL-SCNC: 25 MMOL/L (ref 22–29)
HDL (RMG): 44 MG/DL (ref 40–?)
LDL CALCULATED (RMG): 108 MG/DL (ref 0–130)
POTASSIUM SERPL-SCNC: 3.7 MMOL/L (ref 3.4–5.3)
PROT SERPL-MCNC: 7.9 G/DL (ref 6.4–8.3)
SODIUM SERPL-SCNC: 138 MMOL/L (ref 135–145)
TRIGLYCERIDES (RMG): 124 MG/DL (ref 0–149)

## 2025-09-02 PROCEDURE — 36415 COLL VENOUS BLD VENIPUNCTURE: CPT

## 2025-09-02 PROCEDURE — G2211 COMPLEX E/M VISIT ADD ON: HCPCS

## 2025-09-02 PROCEDURE — 99207 PR FOOT EXAM NO CHARGE: CPT

## 2025-09-02 PROCEDURE — 3078F DIAST BP <80 MM HG: CPT

## 2025-09-02 PROCEDURE — 80061 LIPID PANEL: CPT | Mod: QW

## 2025-09-02 PROCEDURE — 80053 COMPREHEN METABOLIC PANEL: CPT | Mod: ORL

## 2025-09-02 PROCEDURE — 83036 HEMOGLOBIN GLYCOSYLATED A1C: CPT

## 2025-09-02 PROCEDURE — 3049F LDL-C 100-129 MG/DL: CPT

## 2025-09-02 PROCEDURE — 99214 OFFICE O/P EST MOD 30 MIN: CPT

## 2025-09-02 PROCEDURE — 82043 UR ALBUMIN QUANTITATIVE: CPT

## 2025-09-02 PROCEDURE — 3074F SYST BP LT 130 MM HG: CPT

## 2025-09-02 PROCEDURE — 3044F HG A1C LEVEL LT 7.0%: CPT

## 2025-09-02 RX ORDER — ROSUVASTATIN CALCIUM 10 MG/1
10 TABLET, COATED ORAL DAILY
Qty: 90 TABLET | Refills: 3 | Status: SHIPPED | OUTPATIENT
Start: 2025-09-02

## 2025-09-02 RX ORDER — LOSARTAN POTASSIUM AND HYDROCHLOROTHIAZIDE 12.5; 5 MG/1; MG/1
1 TABLET ORAL DAILY
Qty: 90 TABLET | Refills: 3 | Status: SHIPPED | OUTPATIENT
Start: 2025-09-02

## 2025-09-02 RX ORDER — METFORMIN HYDROCHLORIDE 500 MG/1
500 TABLET, EXTENDED RELEASE ORAL 2 TIMES DAILY WITH MEALS
Status: SHIPPED
Start: 2025-09-02

## 2025-09-02 RX ORDER — OMEPRAZOLE 40 MG/1
40 CAPSULE, DELAYED RELEASE ORAL DAILY PRN
Qty: 90 CAPSULE | Refills: 3 | Status: SHIPPED | OUTPATIENT
Start: 2025-09-02

## 2025-09-03 LAB
CREAT UR-MCNC: 222 MG/DL
MICROALBUMIN UR-MCNC: <12 MG/L
MICROALBUMIN/CREAT UR: NORMAL MG/G{CREAT}